# Patient Record
Sex: MALE | Race: WHITE | NOT HISPANIC OR LATINO | Employment: OTHER | URBAN - METROPOLITAN AREA
[De-identification: names, ages, dates, MRNs, and addresses within clinical notes are randomized per-mention and may not be internally consistent; named-entity substitution may affect disease eponyms.]

---

## 2018-02-06 ENCOUNTER — TRANSCRIBE ORDERS (OUTPATIENT)
Dept: ADMINISTRATIVE | Facility: HOSPITAL | Age: 63
End: 2018-02-06

## 2018-02-06 DIAGNOSIS — M79.604 LEG PAIN, BILATERAL: Primary | ICD-10-CM

## 2018-02-06 DIAGNOSIS — M79.605 LEG PAIN, BILATERAL: Primary | ICD-10-CM

## 2018-02-08 ENCOUNTER — TRANSCRIBE ORDERS (OUTPATIENT)
Dept: ADMINISTRATIVE | Facility: HOSPITAL | Age: 63
End: 2018-02-08

## 2018-02-08 DIAGNOSIS — R74.8 ELEVATED LIVER ENZYMES: Primary | ICD-10-CM

## 2018-02-12 ENCOUNTER — TRANSCRIBE ORDERS (OUTPATIENT)
Dept: ADMINISTRATIVE | Facility: HOSPITAL | Age: 63
End: 2018-02-12

## 2018-02-12 DIAGNOSIS — R74.8 ELEVATED LIVER ENZYMES: Primary | ICD-10-CM

## 2018-02-16 ENCOUNTER — HOSPITAL ENCOUNTER (OUTPATIENT)
Dept: CT IMAGING | Facility: HOSPITAL | Age: 63
Discharge: HOME | End: 2018-02-16
Admitting: NURSE PRACTITIONER

## 2018-02-16 DIAGNOSIS — R74.8 ELEVATED LIVER ENZYMES: ICD-10-CM

## 2018-02-16 PROCEDURE — 0 IOPAMIDOL PER 1 ML: Performed by: NURSE PRACTITIONER

## 2018-02-16 PROCEDURE — 74178 CT ABD&PLV WO CNTR FLWD CNTR: CPT

## 2018-02-16 RX ADMIN — IOPAMIDOL 100 ML: 755 INJECTION, SOLUTION INTRAVENOUS at 09:21

## 2018-02-19 ENCOUNTER — APPOINTMENT (OUTPATIENT)
Dept: CT IMAGING | Facility: HOSPITAL | Age: 63
End: 2018-02-19

## 2018-02-19 ENCOUNTER — TRANSCRIBE ORDERS (OUTPATIENT)
Dept: ADMINISTRATIVE | Facility: HOSPITAL | Age: 63
End: 2018-02-19

## 2018-02-19 ENCOUNTER — HOSPITAL ENCOUNTER (OUTPATIENT)
Dept: ULTRASOUND IMAGING | Facility: HOSPITAL | Age: 63
Discharge: HOME | End: 2018-02-19
Admitting: NURSE PRACTITIONER

## 2018-02-19 DIAGNOSIS — M79.605 PAIN IN BOTH LOWER EXTREMITIES: ICD-10-CM

## 2018-02-19 DIAGNOSIS — M79.604 PAIN IN BOTH LOWER EXTREMITIES: ICD-10-CM

## 2018-02-19 DIAGNOSIS — M79.605 LEG PAIN, BILATERAL: ICD-10-CM

## 2018-02-19 DIAGNOSIS — R20.0 LEG NUMBNESS: Primary | ICD-10-CM

## 2018-02-19 DIAGNOSIS — M79.604 LEG PAIN, BILATERAL: ICD-10-CM

## 2018-02-19 PROCEDURE — 93924 LWR XTR VASC STDY BILAT: CPT

## 2018-03-15 ENCOUNTER — APPOINTMENT (OUTPATIENT)
Dept: LAB | Facility: HOSPITAL | Age: 63
End: 2018-03-15
Attending: INTERNAL MEDICINE

## 2018-03-15 ENCOUNTER — HOSPITAL ENCOUNTER (OUTPATIENT)
Dept: MRI IMAGING | Facility: HOSPITAL | Age: 63
Discharge: HOME | End: 2018-03-15
Admitting: NURSE PRACTITIONER

## 2018-03-15 ENCOUNTER — OFFICE VISIT (OUTPATIENT)
Dept: GASTROENTEROLOGY | Facility: CLINIC | Age: 63
End: 2018-03-15

## 2018-03-15 VITALS
WEIGHT: 127.6 LBS | BODY MASS INDEX: 20.51 KG/M2 | HEIGHT: 66 IN | SYSTOLIC BLOOD PRESSURE: 126 MMHG | DIASTOLIC BLOOD PRESSURE: 66 MMHG

## 2018-03-15 DIAGNOSIS — R20.0 LEG NUMBNESS: ICD-10-CM

## 2018-03-15 DIAGNOSIS — R79.89 ELEVATED LFTS: ICD-10-CM

## 2018-03-15 DIAGNOSIS — M79.605 PAIN IN BOTH LOWER EXTREMITIES: ICD-10-CM

## 2018-03-15 DIAGNOSIS — K21.9 GASTROESOPHAGEAL REFLUX DISEASE, ESOPHAGITIS PRESENCE NOT SPECIFIED: Primary | ICD-10-CM

## 2018-03-15 DIAGNOSIS — Z12.11 ENCOUNTER FOR SCREENING FOR MALIGNANT NEOPLASM OF COLON: ICD-10-CM

## 2018-03-15 DIAGNOSIS — M79.604 PAIN IN BOTH LOWER EXTREMITIES: ICD-10-CM

## 2018-03-15 LAB
ALBUMIN SERPL-MCNC: 3.9 G/DL (ref 3.5–5.2)
ALBUMIN/GLOB SERPL: 1.1 G/DL
ALP SERPL-CCNC: 284 U/L (ref 40–129)
ALT SERPL W P-5'-P-CCNC: 51 U/L (ref 5–41)
ANION GAP SERPL CALCULATED.3IONS-SCNC: 13.1 MMOL/L
AST SERPL-CCNC: 133 U/L (ref 5–40)
BASOPHILS # BLD AUTO: 0.15 10*3/MM3 (ref 0–0.2)
BASOPHILS NFR BLD AUTO: 1.2 % (ref 0–2)
BILIRUB SERPL-MCNC: 0.9 MG/DL (ref 0.2–1.2)
BUN BLD-MCNC: 9 MG/DL (ref 8–23)
BUN/CREAT SERPL: 11.8 (ref 7–25)
CALCIUM SPEC-SCNC: 9.5 MG/DL (ref 8.8–10.5)
CHLORIDE SERPL-SCNC: 100 MMOL/L (ref 98–107)
CO2 SERPL-SCNC: 28.9 MMOL/L (ref 22–29)
CREAT BLD-MCNC: 0.76 MG/DL (ref 0.76–1.27)
DEPRECATED RDW RBC AUTO: 59.8 FL (ref 37–54)
EOSINOPHIL # BLD AUTO: 0.16 10*3/MM3 (ref 0.1–0.3)
EOSINOPHIL NFR BLD AUTO: 1.3 % (ref 0–4)
ERYTHROCYTE [DISTWIDTH] IN BLOOD BY AUTOMATED COUNT: 14.1 % (ref 11.5–14.5)
FERRITIN SERPL-MCNC: 1455 NG/ML (ref 30–400)
GFR SERPL CREATININE-BSD FRML MDRD: 104 ML/MIN/1.73
GLOBULIN UR ELPH-MCNC: 3.7 GM/DL
GLUCOSE BLD-MCNC: 90 MG/DL (ref 65–99)
HAV IGM SERPL QL IA: NORMAL
HBV CORE IGM SERPL QL IA: NORMAL
HBV SURFACE AG SERPL QL IA: NORMAL
HCT VFR BLD AUTO: 38.1 % (ref 42–52)
HCV AB SER DONR QL: NORMAL
HGB BLD-MCNC: 12.8 G/DL (ref 14–18)
IMM GRANULOCYTES # BLD: 0.09 10*3/MM3 (ref 0–0.03)
IMM GRANULOCYTES NFR BLD: 0.7 % (ref 0–0.5)
IRON 24H UR-MRATE: 47 MCG/DL (ref 59–158)
IRON SATN MFR SERPL: 23 %
LYMPHOCYTES # BLD AUTO: 2.07 10*3/MM3 (ref 0.6–4.8)
LYMPHOCYTES NFR BLD AUTO: 16.6 % (ref 20–45)
MACROCYTES BLD QL SMEAR: NORMAL
MCH RBC QN AUTO: 38.2 PG (ref 27–31)
MCHC RBC AUTO-ENTMCNC: 33.6 G/DL (ref 31–37)
MCV RBC AUTO: 113.7 FL (ref 80–94)
MONOCYTES # BLD AUTO: 0.64 10*3/MM3 (ref 0–1)
MONOCYTES NFR BLD AUTO: 5.1 % (ref 3–8)
NEUTROPHILS # BLD AUTO: 9.33 10*3/MM3 (ref 1.5–8.3)
NEUTROPHILS NFR BLD AUTO: 75.1 % (ref 45–70)
NRBC BLD MANUAL-RTO: 0 /100 WBC (ref 0–0)
PLAT MORPH BLD: NORMAL
PLATELET # BLD AUTO: 373 10*3/MM3 (ref 140–500)
PMV BLD AUTO: 9.7 FL (ref 7.4–10.4)
POTASSIUM BLD-SCNC: 3.6 MMOL/L (ref 3.5–5.2)
PROT SERPL-MCNC: 7.6 G/DL (ref 6–8.5)
RBC # BLD AUTO: 3.35 10*6/MM3 (ref 4.7–6.1)
SODIUM BLD-SCNC: 142 MMOL/L (ref 136–145)
TIBC SERPL-MCNC: 207 MCG/DL (ref 261–498)
UIBC SERPL-MCNC: 160 MCG/DL (ref 112–346)
WBC MORPH BLD: NORMAL
WBC NRBC COR # BLD: 12.44 10*3/MM3 (ref 4.8–10.8)

## 2018-03-15 PROCEDURE — 80074 ACUTE HEPATITIS PANEL: CPT | Performed by: INTERNAL MEDICINE

## 2018-03-15 PROCEDURE — 83550 IRON BINDING TEST: CPT | Performed by: INTERNAL MEDICINE

## 2018-03-15 PROCEDURE — 86038 ANTINUCLEAR ANTIBODIES: CPT | Performed by: INTERNAL MEDICINE

## 2018-03-15 PROCEDURE — 85007 BL SMEAR W/DIFF WBC COUNT: CPT | Performed by: INTERNAL MEDICINE

## 2018-03-15 PROCEDURE — 80053 COMPREHEN METABOLIC PANEL: CPT | Performed by: INTERNAL MEDICINE

## 2018-03-15 PROCEDURE — 82728 ASSAY OF FERRITIN: CPT | Performed by: INTERNAL MEDICINE

## 2018-03-15 PROCEDURE — 85025 COMPLETE CBC W/AUTO DIFF WBC: CPT | Performed by: INTERNAL MEDICINE

## 2018-03-15 PROCEDURE — 99203 OFFICE O/P NEW LOW 30 MIN: CPT | Performed by: INTERNAL MEDICINE

## 2018-03-15 PROCEDURE — 72148 MRI LUMBAR SPINE W/O DYE: CPT

## 2018-03-15 PROCEDURE — 83516 IMMUNOASSAY NONANTIBODY: CPT | Performed by: INTERNAL MEDICINE

## 2018-03-15 PROCEDURE — 83540 ASSAY OF IRON: CPT | Performed by: INTERNAL MEDICINE

## 2018-03-15 PROCEDURE — 36415 COLL VENOUS BLD VENIPUNCTURE: CPT | Performed by: INTERNAL MEDICINE

## 2018-03-15 PROCEDURE — 82103 ALPHA-1-ANTITRYPSIN TOTAL: CPT | Performed by: INTERNAL MEDICINE

## 2018-03-15 RX ORDER — SODIUM, POTASSIUM,MAG SULFATES 17.5-3.13G
1 SOLUTION, RECONSTITUTED, ORAL ORAL EVERY 12 HOURS
Qty: 2 BOTTLE | Refills: 0 | Status: ON HOLD | COMMUNITY
Start: 2018-03-15 | End: 2018-04-01

## 2018-03-15 NOTE — PROGRESS NOTES
PATIENT INFORMATION  Babak Herrera       - 1955    CHIEF COMPLAINT  Chief Complaint   Patient presents with   • Heartburn   • Elevated Hepatic Enzymes       HISTORY OF PRESENT ILLNESS  HPI    61 yo sent to see me due to elevated liver enzymes note on labs in February. AST in 300's and ALT in 100s.  Normal alk. Phos, amylase and lipase.   TG elevated and cholesterol. CT done which showed:  Precontrast and dynamic postcontrast imaging through the abdomen per  hepatic imaging protocol. Postcontrast imaging through the pelvis.  Enteric contrast not administered. Sagittal and coronal reformatted  images were obtained. Radiation dose reduction techniques were utilized,  including automated exposure control and exposure modulation based on  body size.     ABDOMEN FINDINGS:  Hepatomegaly, 24 cm craniocaudally. Diffuse hepatic steatosis. There are  more generalized and ill-defined somewhat geographic areas of low  densities throughout the liver, greatest in the right hepatic lobe and  caudate lobe, thought to represent more focal superimposed geographic  steatosis. However, no hyperenhancing focal liver lesion is seen, and  there is no evidence of washout kinetics, to suggest underlying  malignancy or hepatocellular carcinoma on this exam.     Gallbladder is normal, no biliary dilation is seen.     Major hepatic vasculature appears opacified and patent.     Spleen size is normal. No ascites.     The pancreas, adrenals and kidneys are within normal limits.     Moderate calcific atherosclerosis within a nonaneurysmal abdominal  aorta.     Lung bases are clear.     Diverticular changes are present within the colon, greatest in its  descending segment, without convincing CT evidence of acute  diverticulitis. The appendix is normal. No evidence of high-grade bowel  obstruction. No pathologic adenopathy is seen.      PELVIS FINDINGS:  Urinary bladder, prostate and rectum are normal. No pelvic adenopathy or  free  fluid.     No acute or suspicious osseous abnormalities are identified. Facet  arthropathy at L4-5.     IMPRESSION:  1. Marked hepatomegaly. Diffuse hepatic steatosis. Irregular somewhat  geographic regions of the superimposed low density throughout the liver  parenchyma, favored to represent areas of more advanced superimposed  geographic hepatic steatosis. Liver edema/hepatitis not excluded.  Correlate with liver function tests and known history.     2. No evidence of cirrhosis. No CT evidence of hepatoma.     He has drank etoh for over 50 years. Patients dad was a heavy drinker.  He was drinking  about 8L of whiskey weekly.  He has stopped in the past 3 weeks  He smokes 2ppd  No family history of autoimmune conditions.      He has underlying gerd and is maintained on nexium daily.   No previous cls. Last egd was 2 years ago at [place].  REVIEW OF SYSTEMS  Review of Systems   Constitutional: Positive for appetite change, chills, fatigue and unexpected weight change.   Eyes: Positive for redness and itching.   Respiratory: Positive for apnea, cough, choking, shortness of breath, wheezing and stridor.    Gastrointestinal:        Reflux   Endocrine: Positive for polydipsia and polyuria.   Genitourinary: Positive for difficulty urinating and frequency.   Musculoskeletal: Positive for arthralgias and myalgias.   Neurological: Positive for weakness and light-headedness.   Hematological: Bruises/bleeds easily.   All other systems reviewed and are negative.        ACTIVE PROBLEMS  There are no active problems to display for this patient.        PAST MEDICAL HISTORY  Past Medical History:   Diagnosis Date   • COPD (chronic obstructive pulmonary disease)    • GERD (gastroesophageal reflux disease)    • Hypertension          SURGICAL HISTORY  No past surgical history on file.      FAMILY HISTORY  No family history on file.      SOCIAL HISTORY  Social History     Occupational History   • Not on file.     Social History Main  "Topics   • Smoking status: Current Every Day Smoker   • Smokeless tobacco: Not on file   • Alcohol use Yes   • Drug use: Unknown   • Sexual activity: Not on file         CURRENT MEDICATIONS    Current Outpatient Prescriptions:   •  amLODIPine (NORVASC) 5 MG tablet, Take 5 mg by mouth Daily., Disp: , Rfl:   •  aspirin 81 MG EC tablet, Take 81 mg by mouth Daily., Disp: , Rfl:   •  budesonide-formoterol (SYMBICORT) 160-4.5 MCG/ACT inhaler, Inhale 2 puffs 2 (Two) Times a Day., Disp: , Rfl:   •  buPROPion (WELLBUTRIN) 75 MG tablet, Take 75 mg by mouth 2 (Two) Times a Day., Disp: , Rfl:   •  esomeprazole (nexIUM) 20 MG capsule, Take 20 mg by mouth Every Morning Before Breakfast., Disp: , Rfl:     ALLERGIES  Lisinopril and Erythromycin    VITALS  Vitals:    03/15/18 0946   BP: 126/66   Weight: 57.9 kg (127 lb 9.6 oz)   Height: 167.6 cm (66\")       LAST RESULTS   No results found for any previous visit.     Ct Abdomen Pelvis With & Without Contrast    Result Date: 2/16/2018  Narrative: CT ABDOMEN WITHOUT AND WITH CONTRAST, CT PELVIS WITH CONTRAST (HEPATIC IMAGING PROTOCOL)  DATE: 02/16/2018.  HISTORY: ABNORMAL LIVER FUNCTION TESTS. ABDOMINAL PAIN INTERMITTENT FOR YEARS. BILATERAL Lower extremity PAIN.  No documented prior CT scan or nuclear medicine myocardial perfusion scan in the past 12 months.  COMPARISON: None.  TECHNIQUE: Precontrast and dynamic postcontrast imaging through the abdomen per hepatic imaging protocol. Postcontrast imaging through the pelvis. Enteric contrast not administered. Sagittal and coronal reformatted images were obtained. Radiation dose reduction techniques were utilized, including automated exposure control and exposure modulation based on body size.  ABDOMEN FINDINGS: Hepatomegaly, 24 cm craniocaudally. Diffuse hepatic steatosis. There are more generalized and ill-defined somewhat geographic areas of low densities throughout the liver, greatest in the right hepatic lobe and caudate lobe, " thought to represent more focal superimposed geographic steatosis. However, no hyperenhancing focal liver lesion is seen, and there is no evidence of washout kinetics, to suggest underlying malignancy or hepatocellular carcinoma on this exam.  Gallbladder is normal, no biliary dilation is seen.  Major hepatic vasculature appears opacified and patent.  Spleen size is normal. No ascites.  The pancreas, adrenals and kidneys are within normal limits.  Moderate calcific atherosclerosis within a nonaneurysmal abdominal aorta.  Lung bases are clear.  Diverticular changes are present within the colon, greatest in its descending segment, without convincing CT evidence of acute diverticulitis. The appendix is normal. No evidence of high-grade bowel obstruction. No pathologic adenopathy is seen.  PELVIS FINDINGS: Urinary bladder, prostate and rectum are normal. No pelvic adenopathy or free fluid.  No acute or suspicious osseous abnormalities are identified. Facet arthropathy at L4-5.      Impression: 1. Marked hepatomegaly. Diffuse hepatic steatosis. Irregular somewhat geographic regions of the superimposed low density throughout the liver parenchyma, favored to represent areas of more advanced superimposed geographic hepatic steatosis. Liver edema/hepatitis not excluded. Correlate with liver function tests and known history.  2. No evidence of cirrhosis. No CT evidence of hepatoma.  This report was finalized on 2/16/2018 3:00 PM by Dr. Sara Seay MD.      Mri Lumbar Spine Without Contrast    Result Date: 3/15/2018  Narrative: MRI LUMBAR SPINE WITHOUT CONTRAST  INDICATION: Lower back pain with bilateral lower extremity radiculopathy for the past year  PROCEDURE: Sagittal and axial T1 and T2-weighted imaging of the lumbar spine  COMPARISON: None  FINDINGS:  Lumbar bodies have normal height. 3 mm anterolisthesis L3 on L4. Otherwise, alignment is preserved. Mild component of congenital central canal narrowing. Conus  terminates at T12-L1 and has normal caliber and signal intensity.  L1-L2: Very mild broad-based posterior disc bulge. No significant central canal narrowing. Mild bilateral neural foraminal narrowing.  L2-L3: Mild facet arthrosis. Mild broad-based posterior disc bulge. Moderate bilateral neural foraminal narrowing related to facet change and foraminal disc osteophytes.  L3-L4: Mild anterolisthesis as detailed above. Moderate to moderately severe bilateral facet arthrosis. Broad-based posterior disc osteophyte. Moderately severe to severe central canal narrowing. Moderate bilateral neural foraminal narrowing.  L4-L5: Moderate facet arthrosis. Broad-based posterior disc osteophyte. Moderately severe central canal narrowing. Moderate to moderately severe bilateral neural foraminal narrowing.  L5-S1: Transitional vertebral body with sacralization of L5. Small remainder the disc at L5-S1. No significant central canal narrowing. No significant neural foraminal      Impression: 3 mm anterolisthesis of L3 on L4. Transitional vertebral body with sacralization of L5.  Multilevel degenerative disc disease and facet arthrosis. Central canal narrowing is most significant at L3-L4.  Neural foraminal narrowing is most significant bilaterally at L4-L5.  This report was finalized on 3/15/2018 9:44 AM by Dr. Chirag Collier MD.      Us Segmental Limbs Lower Arterial With Stress    Result Date: 2/19/2018  Narrative: BILATERAL LOWER EXTREMITY NONINVASIVE ARTERIAL VASCULAR TESTING, INCLUDING POST EXERCISE ASSESSMENT, 02/19/2018:  HISTORY: 62-year-old male referred for a one year history of bilateral leg pain, right worse than left. History of hypertension, hyperlipidemia, smoking.  TECHNIQUE: Lower extremity segmental cuff pressure measurements, pulse-volume recordings (PVR), and segmental arterial Doppler waveforms were obtained at rest bilaterally along with bilateral brachial pressures.  Measured leg levels included the upper and lower  thigh, upper calf, ankle and great toe bilaterally, and included separate posterior tibial and dorsalis pedis measurements at each ankle. Ankle brachial indices were obtained post exercise.  FINDINGS: The examination is within normal limits.  No significant segmental pressure gradients are identified when comparing contiguous lower extremity levels.  Leg-brachial indices are near, or greater than 1.0 at all levels.  Resting JOSE's measure up to 1.10 on the right and 1.11 on the left.  Pulse-volume recordings and segmental Doppler waveforms are within normal limits bilaterally.  There is no evidence of significant lower extremity arterial occlusive disease at rest.  Following exercise, there is normal increase in measured cuff pressure at each ankle. There is no abnormal drop in measured ankle pressure to suggest flow-limiting stenosis with activity.      Impression: 1. Normal examination.  No evidence of significant lower extremity arterial occlusive disease at rest or post exercise. 2. Ankle-brachial indices measure up to 1.10 on the right and 1.11 on the left.  This report was finalized on 2/19/2018 3:38 PM by Dr. Kd Sexton MD.        PHYSICAL EXAM  Physical Exam   Constitutional: He is oriented to person, place, and time. He appears well-developed and well-nourished. No distress.   HENT:   Head: Normocephalic and atraumatic.   Eyes: EOM are normal. Pupils are equal, round, and reactive to light.   Neck: Neck supple. No tracheal deviation present.   Cardiovascular: Normal rate, regular rhythm, normal heart sounds and intact distal pulses.  Exam reveals no gallop and no friction rub.    No murmur heard.  Pulmonary/Chest: Effort normal and breath sounds normal. No respiratory distress. He has no wheezes. He has no rales. He exhibits no tenderness.   Abdominal: Soft. Bowel sounds are normal. He exhibits no distension. There is no tenderness. There is no rebound and no guarding.   Musculoskeletal: He exhibits  no edema.   Lymphadenopathy:     He has no cervical adenopathy.   Neurological: He is alert and oriented to person, place, and time.   Skin: Skin is warm. He is not diaphoretic. No erythema.   Psychiatric: He has a normal mood and affect. His behavior is normal. Judgment and thought content normal.   Nursing note and vitals reviewed.      ASSESSMENT  Diagnoses and all orders for this visit:    Gastroesophageal reflux disease, esophagitis presence not specified    Elevated LFTs  -     Alpha-1-antitrypsin  -     RUFUS  -     CBC and differential  -     Comprehensive metabolic panel  -     Ferritin  -     Hepatitis panel, acute  -     Iron and TIBC  -     Mitochondrial antibodies, M2    Encounter for screening for malignant neoplasm of colon  -     Case Request; Standing  -     Case Request    Other orders  -     Implement Anesthesia orders day of procedure.; Standing  -     Obtain informed consent; Standing          PLAN  No Follow-up on file.    In regards to elevated lfts -pattern likely consistent with etoh use. Ct with steatosis likely from etoh and elevated tg. Continue with alcohol cessation, low carb diet, exercise and weight loss.   Will do full workup.    Antireflux measures and dietary modifications reviewed. Low acid diet reviewed. Keep head of bed elevated. Stop eating/drinking at least 3 hours prior to bedtime. Eliminate caffeine and carbonated beverages.  Weight loss encouraged if BMI over 25.        Risks, benefits and alternatives discussed including but not limited to the complications of bleeding, perforation and sedation related problems.

## 2018-03-16 DIAGNOSIS — R79.89 ELEVATED LFTS: Primary | ICD-10-CM

## 2018-03-16 LAB
ANA PAT SER-IMP: ABNORMAL
ANA SER QL: ABNORMAL

## 2018-03-16 NOTE — PROGRESS NOTES
Will let him know ferritin elevated, think likely related to etoh use but need that lab. Others will discuss on follow up

## 2018-03-19 ENCOUNTER — APPOINTMENT (OUTPATIENT)
Dept: LAB | Facility: HOSPITAL | Age: 63
End: 2018-03-19
Attending: INTERNAL MEDICINE

## 2018-03-19 PROCEDURE — 36415 COLL VENOUS BLD VENIPUNCTURE: CPT

## 2018-03-19 PROCEDURE — 81256 HFE GENE: CPT | Performed by: INTERNAL MEDICINE

## 2018-03-20 LAB
A1AT SERPL-MCNC: 183 MG/DL (ref 90–200)
DEPRECATED MITOCHONDRIA M2 IGG SER-ACNC: <20 UNITS (ref 0–20)

## 2018-03-26 LAB — HFE GENE MUT ANL BLD/T: NORMAL

## 2018-03-29 ENCOUNTER — ANESTHESIA EVENT (OUTPATIENT)
Dept: PERIOP | Facility: HOSPITAL | Age: 63
End: 2018-03-29

## 2018-03-30 ENCOUNTER — HOSPITAL ENCOUNTER (OUTPATIENT)
Facility: HOSPITAL | Age: 63
Setting detail: HOSPITAL OUTPATIENT SURGERY
Discharge: HOME | End: 2018-03-30
Attending: INTERNAL MEDICINE | Admitting: INTERNAL MEDICINE

## 2018-03-30 ENCOUNTER — ANESTHESIA (OUTPATIENT)
Dept: PERIOP | Facility: HOSPITAL | Age: 63
End: 2018-03-30

## 2018-03-30 VITALS
BODY MASS INDEX: 20.79 KG/M2 | SYSTOLIC BLOOD PRESSURE: 125 MMHG | TEMPERATURE: 98 F | OXYGEN SATURATION: 95 % | DIASTOLIC BLOOD PRESSURE: 78 MMHG | WEIGHT: 128.8 LBS | RESPIRATION RATE: 15 BRPM | HEART RATE: 73 BPM

## 2018-03-30 DIAGNOSIS — Z12.11 ENCOUNTER FOR SCREENING FOR MALIGNANT NEOPLASM OF COLON: ICD-10-CM

## 2018-03-30 PROCEDURE — 93010 ELECTROCARDIOGRAM REPORT: CPT | Performed by: INTERNAL MEDICINE

## 2018-03-30 PROCEDURE — 45385 COLONOSCOPY W/LESION REMOVAL: CPT | Performed by: INTERNAL MEDICINE

## 2018-03-30 PROCEDURE — 93005 ELECTROCARDIOGRAM TRACING: CPT | Performed by: NURSE ANESTHETIST, CERTIFIED REGISTERED

## 2018-03-30 PROCEDURE — 25010000002 PROPOFOL 10 MG/ML EMULSION: Performed by: NURSE ANESTHETIST, CERTIFIED REGISTERED

## 2018-03-30 RX ORDER — LIDOCAINE HYDROCHLORIDE 20 MG/ML
INJECTION, SOLUTION INFILTRATION; PERINEURAL AS NEEDED
Status: DISCONTINUED | OUTPATIENT
Start: 2018-03-30 | End: 2018-03-30 | Stop reason: SURG

## 2018-03-30 RX ORDER — SODIUM CHLORIDE, SODIUM LACTATE, POTASSIUM CHLORIDE, CALCIUM CHLORIDE 600; 310; 30; 20 MG/100ML; MG/100ML; MG/100ML; MG/100ML
9 INJECTION, SOLUTION INTRAVENOUS CONTINUOUS
Status: DISCONTINUED | OUTPATIENT
Start: 2018-03-30 | End: 2018-03-30 | Stop reason: HOSPADM

## 2018-03-30 RX ORDER — SODIUM CHLORIDE 9 MG/ML
40 INJECTION, SOLUTION INTRAVENOUS AS NEEDED
Status: DISCONTINUED | OUTPATIENT
Start: 2018-03-30 | End: 2018-03-30 | Stop reason: HOSPADM

## 2018-03-30 RX ORDER — MAGNESIUM HYDROXIDE 1200 MG/15ML
LIQUID ORAL AS NEEDED
Status: DISCONTINUED | OUTPATIENT
Start: 2018-03-30 | End: 2018-03-30 | Stop reason: HOSPADM

## 2018-03-30 RX ORDER — SODIUM CHLORIDE 0.9 % (FLUSH) 0.9 %
1-10 SYRINGE (ML) INJECTION AS NEEDED
Status: DISCONTINUED | OUTPATIENT
Start: 2018-03-30 | End: 2018-03-30 | Stop reason: HOSPADM

## 2018-03-30 RX ORDER — LIDOCAINE HYDROCHLORIDE 10 MG/ML
0.5 INJECTION, SOLUTION EPIDURAL; INFILTRATION; INTRACAUDAL; PERINEURAL ONCE AS NEEDED
Status: DISCONTINUED | OUTPATIENT
Start: 2018-03-30 | End: 2018-03-30 | Stop reason: HOSPADM

## 2018-03-30 RX ORDER — PROPOFOL 10 MG/ML
VIAL (ML) INTRAVENOUS AS NEEDED
Status: DISCONTINUED | OUTPATIENT
Start: 2018-03-30 | End: 2018-03-30 | Stop reason: SURG

## 2018-03-30 RX ADMIN — SODIUM CHLORIDE, POTASSIUM CHLORIDE, SODIUM LACTATE AND CALCIUM CHLORIDE: 600; 310; 30; 20 INJECTION, SOLUTION INTRAVENOUS at 11:31

## 2018-03-30 RX ADMIN — PROPOFOL 50 MG: 10 INJECTION, EMULSION INTRAVENOUS at 13:26

## 2018-03-30 RX ADMIN — LIDOCAINE HYDROCHLORIDE 100 MG: 20 INJECTION, SOLUTION INFILTRATION; PERINEURAL at 13:02

## 2018-03-30 RX ADMIN — PROPOFOL 50 MG: 10 INJECTION, EMULSION INTRAVENOUS at 13:30

## 2018-03-30 RX ADMIN — PROPOFOL 100 MG: 10 INJECTION, EMULSION INTRAVENOUS at 13:05

## 2018-03-30 RX ADMIN — PROPOFOL 50 MG: 10 INJECTION, EMULSION INTRAVENOUS at 13:16

## 2018-03-30 RX ADMIN — PROPOFOL 50 MG: 10 INJECTION, EMULSION INTRAVENOUS at 13:10

## 2018-03-30 RX ADMIN — PROPOFOL 50 MG: 10 INJECTION, EMULSION INTRAVENOUS at 13:13

## 2018-03-30 RX ADMIN — PROPOFOL 50 MG: 10 INJECTION, EMULSION INTRAVENOUS at 13:23

## 2018-03-30 RX ADMIN — PROPOFOL 50 MG: 10 INJECTION, EMULSION INTRAVENOUS at 13:19

## 2018-03-30 RX ADMIN — PROPOFOL 50 MG: 10 INJECTION, EMULSION INTRAVENOUS at 13:07

## 2018-03-30 NOTE — ANESTHESIA POSTPROCEDURE EVALUATION
Patient: Babak Herrera    Procedure Summary     Date:  03/30/18 Room / Location:  [facility] ENDOSCOPY 2 /  [facility] OR    Anesthesia Start:  1301 Anesthesia Stop:  1347    Procedure:  COLONOSCOPY,  polypectomy (N/A ) Diagnosis:       Encounter for screening for malignant neoplasm of colon      (Encounter for screening for malignant neoplasm of colon [Z12.11])    Surgeon:  Nicolasa Stewart MD Provider:  Oswaldo Mcwilliams CRNA    Anesthesia Type:  MAC ASA Status:  3          Anesthesia Type: MAC  Last vitals  BP   110/77 (03/30/18 1353)   Temp   98 °F (36.7 °C) (03/30/18 1353)   Pulse   79 (03/30/18 1353)   Resp   16 (03/30/18 1353)     SpO2   93 % (03/30/18 1353)     Post Anesthesia Care and Evaluation    Patient location during evaluation: PHASE II  Patient participation: complete - patient participated  Level of consciousness: awake and alert  Pain management: adequate  Airway patency: patent  Anesthetic complications: No anesthetic complications    Cardiovascular status: acceptable and hemodynamically stable  Respiratory status: acceptable  Hydration status: acceptable

## 2018-03-30 NOTE — ANESTHESIA PREPROCEDURE EVALUATION
" Anesthesia Evaluation     Patient summary reviewed and Nursing notes reviewed   NPO Solid Status: > 8 hours  NPO Liquid Status: > 2 hours           Airway   TM distance: >3 FB  Neck ROM: full  No difficulty expected  Dental      Comment: DENTAL IMPLANTS     Pulmonary    (+) a smoker (2 PPDX 50 YRS) Current Smoked day of surgery, COPD, sleep apnea, rhonchi (SCATTERED LIGHT),     ROS comment: \"SMOKERS COUGH\" STATES CLEAR SPUTUM  Cardiovascular - normal exam  Exercise tolerance: good (4-7 METS)    (+) hypertension,     ROS comment: Denies chest pain or SOB    Neuro/Psych- negative ROS  GI/Hepatic/Renal/Endo    (+)  GERD,    Liver disease: ELAVATED LIVER ENZYMES.    Musculoskeletal (-) negative ROS    Abdominal    Substance History   (+) alcohol use (ALCOHLIC/ QUIT DRINKING 30 DAYS AGO),      OB/GYN          Other                        Anesthesia Plan    ASA 3     MAC     intravenous induction   Anesthetic plan and risks discussed with patient.  Use of blood products discussed with patient  Consented to blood products.     "

## 2018-03-31 ENCOUNTER — APPOINTMENT (OUTPATIENT)
Dept: CT IMAGING | Facility: HOSPITAL | Age: 63
End: 2018-03-31

## 2018-03-31 ENCOUNTER — HOSPITAL ENCOUNTER (INPATIENT)
Facility: HOSPITAL | Age: 63
LOS: 6 days | Discharge: HOME | End: 2018-04-06
Attending: EMERGENCY MEDICINE | Admitting: INTERNAL MEDICINE

## 2018-03-31 DIAGNOSIS — R79.89 ELEVATED LFTS: ICD-10-CM

## 2018-03-31 DIAGNOSIS — K81.0 ACUTE CHOLECYSTITIS: Primary | ICD-10-CM

## 2018-03-31 DIAGNOSIS — R10.11 RIGHT UPPER QUADRANT ABDOMINAL PAIN: ICD-10-CM

## 2018-03-31 PROBLEM — R10.9 ABDOMINAL PAIN: Status: ACTIVE | Noted: 2018-03-31

## 2018-03-31 LAB
ALBUMIN SERPL-MCNC: 4.3 G/DL (ref 3.5–5.2)
ALBUMIN/GLOB SERPL: 1.1 G/DL
ALP SERPL-CCNC: 253 U/L (ref 40–129)
ALT SERPL W P-5'-P-CCNC: 37 U/L (ref 5–41)
ANION GAP SERPL CALCULATED.3IONS-SCNC: 14.6 MMOL/L
APTT PPP: 38.9 SECONDS (ref 24.3–38.1)
AST SERPL-CCNC: 82 U/L (ref 5–40)
BACTERIA UR QL AUTO: ABNORMAL /HPF
BASOPHILS # BLD AUTO: 0.1 10*3/MM3 (ref 0–0.2)
BASOPHILS NFR BLD AUTO: 0.6 % (ref 0–2)
BILIRUB SERPL-MCNC: 0.8 MG/DL (ref 0.2–1.2)
BILIRUB UR QL STRIP: NEGATIVE
BUN BLD-MCNC: 8 MG/DL (ref 8–23)
BUN/CREAT SERPL: 10 (ref 7–25)
CALCIUM SPEC-SCNC: 9.3 MG/DL (ref 8.8–10.5)
CHLORIDE SERPL-SCNC: 95 MMOL/L (ref 98–107)
CLARITY UR: CLEAR
CO2 SERPL-SCNC: 23.4 MMOL/L (ref 22–29)
COLOR UR: YELLOW
CREAT BLD-MCNC: 0.8 MG/DL (ref 0.76–1.27)
D-LACTATE SERPL-SCNC: 1.4 MMOL/L (ref 0.5–2)
DEPRECATED RDW RBC AUTO: 53.4 FL (ref 37–54)
EOSINOPHIL # BLD AUTO: 0.06 10*3/MM3 (ref 0.1–0.3)
EOSINOPHIL NFR BLD AUTO: 0.4 % (ref 0–4)
ERYTHROCYTE [DISTWIDTH] IN BLOOD BY AUTOMATED COUNT: 13.5 % (ref 11.5–14.5)
GFR SERPL CREATININE-BSD FRML MDRD: 98 ML/MIN/1.73
GLOBULIN UR ELPH-MCNC: 3.8 GM/DL
GLUCOSE BLD-MCNC: 140 MG/DL (ref 65–99)
GLUCOSE UR STRIP-MCNC: NEGATIVE MG/DL
HCT VFR BLD AUTO: 41.5 % (ref 42–52)
HGB BLD-MCNC: 14.2 G/DL (ref 14–18)
HGB UR QL STRIP.AUTO: ABNORMAL
HYALINE CASTS UR QL AUTO: ABNORMAL /LPF
IMM GRANULOCYTES # BLD: 0.06 10*3/MM3 (ref 0–0.03)
IMM GRANULOCYTES NFR BLD: 0.4 % (ref 0–0.5)
INR PPP: 0.97 (ref 0.9–1.1)
KETONES UR QL STRIP: NEGATIVE
LEUKOCYTE ESTERASE UR QL STRIP.AUTO: NEGATIVE
LIPASE SERPL-CCNC: 25 U/L (ref 13–60)
LYMPHOCYTES # BLD AUTO: 1.49 10*3/MM3 (ref 0.6–4.8)
LYMPHOCYTES NFR BLD AUTO: 9.3 % (ref 20–45)
MACROCYTES BLD QL SMEAR: NORMAL
MCH RBC QN AUTO: 36.4 PG (ref 27–31)
MCHC RBC AUTO-ENTMCNC: 34.2 G/DL (ref 31–37)
MCV RBC AUTO: 106.4 FL (ref 80–94)
MONOCYTES # BLD AUTO: 0.62 10*3/MM3 (ref 0–1)
MONOCYTES NFR BLD AUTO: 3.9 % (ref 3–8)
NEUTROPHILS # BLD AUTO: 13.68 10*3/MM3 (ref 1.5–8.3)
NEUTROPHILS NFR BLD AUTO: 85.4 % (ref 45–70)
NITRITE UR QL STRIP: NEGATIVE
NRBC BLD MANUAL-RTO: 0 /100 WBC (ref 0–0)
PH UR STRIP.AUTO: 6 [PH] (ref 4.5–8)
PLAT MORPH BLD: NORMAL
PLATELET # BLD AUTO: 279 10*3/MM3 (ref 140–500)
PMV BLD AUTO: 11 FL (ref 7.4–10.4)
POTASSIUM BLD-SCNC: 3.5 MMOL/L (ref 3.5–5.2)
PROT SERPL-MCNC: 8.1 G/DL (ref 6–8.5)
PROT UR QL STRIP: NEGATIVE
PROTHROMBIN TIME: 12.9 SECONDS (ref 12.1–15)
RBC # BLD AUTO: 3.9 10*6/MM3 (ref 4.7–6.1)
RBC # UR: ABNORMAL /HPF
REF LAB TEST METHOD: ABNORMAL
SODIUM BLD-SCNC: 133 MMOL/L (ref 136–145)
SP GR UR STRIP: 1.01 (ref 1–1.03)
SQUAMOUS #/AREA URNS HPF: ABNORMAL /HPF
STOMATOCYTES BLD QL SMEAR: NORMAL
TROPONIN T SERPL-MCNC: <0.01 NG/ML (ref 0–0.03)
UROBILINOGEN UR QL STRIP: ABNORMAL
WBC MORPH BLD: NORMAL
WBC NRBC COR # BLD: 16.01 10*3/MM3 (ref 4.8–10.8)
WBC UR QL AUTO: ABNORMAL /HPF

## 2018-03-31 PROCEDURE — 74177 CT ABD & PELVIS W/CONTRAST: CPT

## 2018-03-31 PROCEDURE — 99285 EMERGENCY DEPT VISIT HI MDM: CPT

## 2018-03-31 PROCEDURE — 80053 COMPREHEN METABOLIC PANEL: CPT | Performed by: PHYSICIAN ASSISTANT

## 2018-03-31 PROCEDURE — 84484 ASSAY OF TROPONIN QUANT: CPT | Performed by: PHYSICIAN ASSISTANT

## 2018-03-31 PROCEDURE — 99284 EMERGENCY DEPT VISIT MOD MDM: CPT | Performed by: PHYSICIAN ASSISTANT

## 2018-03-31 PROCEDURE — 85007 BL SMEAR W/DIFF WBC COUNT: CPT | Performed by: PHYSICIAN ASSISTANT

## 2018-03-31 PROCEDURE — 85730 THROMBOPLASTIN TIME PARTIAL: CPT | Performed by: PHYSICIAN ASSISTANT

## 2018-03-31 PROCEDURE — 81001 URINALYSIS AUTO W/SCOPE: CPT | Performed by: PHYSICIAN ASSISTANT

## 2018-03-31 PROCEDURE — 25010000002 PIPERACILLIN-TAZOBACTAM: Performed by: PHYSICIAN ASSISTANT

## 2018-03-31 PROCEDURE — 85025 COMPLETE CBC W/AUTO DIFF WBC: CPT | Performed by: PHYSICIAN ASSISTANT

## 2018-03-31 PROCEDURE — 83690 ASSAY OF LIPASE: CPT | Performed by: PHYSICIAN ASSISTANT

## 2018-03-31 PROCEDURE — 25010000002 ONDANSETRON PER 1 MG: Performed by: PHYSICIAN ASSISTANT

## 2018-03-31 PROCEDURE — 25010000002 HYDROMORPHONE PER 4 MG: Performed by: EMERGENCY MEDICINE

## 2018-03-31 PROCEDURE — 0 IOPAMIDOL PER 1 ML: Performed by: EMERGENCY MEDICINE

## 2018-03-31 PROCEDURE — 85610 PROTHROMBIN TIME: CPT | Performed by: PHYSICIAN ASSISTANT

## 2018-03-31 PROCEDURE — 83605 ASSAY OF LACTIC ACID: CPT | Performed by: EMERGENCY MEDICINE

## 2018-03-31 PROCEDURE — 25010000002 MORPHINE PER 10 MG: Performed by: EMERGENCY MEDICINE

## 2018-03-31 PROCEDURE — 25010000002 HYDROMORPHONE HCL PF 500 MG/50ML SOLUTION: Performed by: INTERNAL MEDICINE

## 2018-03-31 RX ORDER — HYDROMORPHONE HCL 110MG/55ML
PATIENT CONTROLLED ANALGESIA SYRINGE INTRAVENOUS
Status: DISPENSED
Start: 2018-03-31 | End: 2018-04-01

## 2018-03-31 RX ORDER — FAMOTIDINE 20 MG/50ML
20 INJECTION, SOLUTION INTRAVENOUS ONCE
Status: COMPLETED | OUTPATIENT
Start: 2018-03-31 | End: 2018-03-31

## 2018-03-31 RX ORDER — MORPHINE SULFATE 2 MG/ML
1 INJECTION, SOLUTION INTRAMUSCULAR; INTRAVENOUS ONCE
Status: COMPLETED | OUTPATIENT
Start: 2018-03-31 | End: 2018-03-31

## 2018-03-31 RX ORDER — NICOTINE 21 MG/24HR
1 PATCH, TRANSDERMAL 24 HOURS TRANSDERMAL EVERY 24 HOURS
Status: DISCONTINUED | OUTPATIENT
Start: 2018-03-31 | End: 2018-04-06 | Stop reason: HOSPADM

## 2018-03-31 RX ORDER — SODIUM CHLORIDE 0.9 % (FLUSH) 0.9 %
10 SYRINGE (ML) INJECTION AS NEEDED
Status: DISCONTINUED | OUTPATIENT
Start: 2018-03-31 | End: 2018-04-06 | Stop reason: HOSPADM

## 2018-03-31 RX ORDER — HYDROMORPHONE HCL 110MG/55ML
0.5 PATIENT CONTROLLED ANALGESIA SYRINGE INTRAVENOUS ONCE
Status: COMPLETED | OUTPATIENT
Start: 2018-03-31 | End: 2018-03-31

## 2018-03-31 RX ORDER — SODIUM CHLORIDE 9 MG/ML
125 INJECTION, SOLUTION INTRAVENOUS CONTINUOUS
Status: DISCONTINUED | OUTPATIENT
Start: 2018-03-31 | End: 2018-04-01

## 2018-03-31 RX ORDER — ONDANSETRON 2 MG/ML
4 INJECTION INTRAMUSCULAR; INTRAVENOUS ONCE
Status: COMPLETED | OUTPATIENT
Start: 2018-03-31 | End: 2018-03-31

## 2018-03-31 RX ORDER — NICOTINE 21 MG/24HR
PATCH, TRANSDERMAL 24 HOURS TRANSDERMAL
Status: COMPLETED
Start: 2018-03-31 | End: 2018-04-01

## 2018-03-31 RX ADMIN — HYDROMORPHONE HYDROCHLORIDE 0.5 MG: 10 INJECTION INTRAMUSCULAR; INTRAVENOUS; SUBCUTANEOUS at 22:24

## 2018-03-31 RX ADMIN — HYDROMORPHONE HYDROCHLORIDE 0.5 MG: 2 INJECTION INTRAMUSCULAR; INTRAVENOUS; SUBCUTANEOUS at 17:56

## 2018-03-31 RX ADMIN — NICOTINE 1 PATCH: 21 PATCH TRANSDERMAL at 22:14

## 2018-03-31 RX ADMIN — HYDROMORPHONE HYDROCHLORIDE 0.5 MG: 2 INJECTION INTRAMUSCULAR; INTRAVENOUS; SUBCUTANEOUS at 18:21

## 2018-03-31 RX ADMIN — FAMOTIDINE 20 MG: 20 INJECTION, SOLUTION INTRAVENOUS at 17:57

## 2018-03-31 RX ADMIN — MORPHINE SULFATE 1 MG: 2 INJECTION, SOLUTION INTRAMUSCULAR; INTRAVENOUS at 19:54

## 2018-03-31 RX ADMIN — ONDANSETRON 4 MG: 2 INJECTION INTRAMUSCULAR; INTRAVENOUS at 17:55

## 2018-03-31 RX ADMIN — SODIUM CHLORIDE 125 ML/HR: 9 INJECTION, SOLUTION INTRAVENOUS at 19:44

## 2018-03-31 RX ADMIN — NICOTINE 1 PATCH: 21 PATCH, EXTENDED RELEASE TOPICAL at 22:14

## 2018-03-31 RX ADMIN — TAZOBACTAM SODIUM AND PIPERACILLIN SODIUM 4.5 G: .5; 4 INJECTION, POWDER, LYOPHILIZED, FOR SOLUTION INTRAVENOUS at 19:46

## 2018-03-31 RX ADMIN — IOPAMIDOL 100 ML: 755 INJECTION, SOLUTION INTRAVENOUS at 18:45

## 2018-04-01 ENCOUNTER — APPOINTMENT (OUTPATIENT)
Dept: GENERAL RADIOLOGY | Facility: HOSPITAL | Age: 63
End: 2018-04-01

## 2018-04-01 ENCOUNTER — APPOINTMENT (OUTPATIENT)
Dept: ULTRASOUND IMAGING | Facility: HOSPITAL | Age: 63
End: 2018-04-01

## 2018-04-01 ENCOUNTER — APPOINTMENT (OUTPATIENT)
Dept: NUCLEAR MEDICINE | Facility: HOSPITAL | Age: 63
End: 2018-04-01

## 2018-04-01 LAB
ALBUMIN SERPL-MCNC: 3.9 G/DL (ref 3.5–5.2)
ALBUMIN/GLOB SERPL: 1.2 G/DL
ALP SERPL-CCNC: 240 U/L (ref 40–129)
ALT SERPL W P-5'-P-CCNC: 40 U/L (ref 5–41)
ANION GAP SERPL CALCULATED.3IONS-SCNC: 14.5 MMOL/L
AST SERPL-CCNC: 82 U/L (ref 5–40)
BASOPHILS # BLD AUTO: 0.12 10*3/MM3 (ref 0–0.2)
BASOPHILS NFR BLD AUTO: 0.7 % (ref 0–2)
BILIRUB SERPL-MCNC: 1.6 MG/DL (ref 0.2–1.2)
BUN BLD-MCNC: 7 MG/DL (ref 8–23)
BUN/CREAT SERPL: 9.7 (ref 7–25)
CALCIUM SPEC-SCNC: 9 MG/DL (ref 8.8–10.5)
CHLORIDE SERPL-SCNC: 98 MMOL/L (ref 98–107)
CO2 SERPL-SCNC: 22.5 MMOL/L (ref 22–29)
CREAT BLD-MCNC: 0.72 MG/DL (ref 0.76–1.27)
DEPRECATED RDW RBC AUTO: 53.1 FL (ref 37–54)
EOSINOPHIL # BLD AUTO: 0.16 10*3/MM3 (ref 0.1–0.3)
EOSINOPHIL NFR BLD AUTO: 0.9 % (ref 0–4)
ERYTHROCYTE [DISTWIDTH] IN BLOOD BY AUTOMATED COUNT: 13.3 % (ref 11.5–14.5)
GFR SERPL CREATININE-BSD FRML MDRD: 110 ML/MIN/1.73
GLOBULIN UR ELPH-MCNC: 3.2 GM/DL
GLUCOSE BLD-MCNC: 113 MG/DL (ref 65–99)
HCT VFR BLD AUTO: 40.3 % (ref 42–52)
HGB BLD-MCNC: 13.7 G/DL (ref 14–18)
IMM GRANULOCYTES # BLD: 0.07 10*3/MM3 (ref 0–0.03)
IMM GRANULOCYTES NFR BLD: 0.4 % (ref 0–0.5)
INR PPP: 0.96 (ref 0.9–1.1)
LYMPHOCYTES # BLD AUTO: 1.35 10*3/MM3 (ref 0.6–4.8)
LYMPHOCYTES NFR BLD AUTO: 7.7 % (ref 20–45)
MCH RBC QN AUTO: 36.1 PG (ref 27–31)
MCHC RBC AUTO-ENTMCNC: 34 G/DL (ref 31–37)
MCV RBC AUTO: 106.3 FL (ref 80–94)
MONOCYTES # BLD AUTO: 1.22 10*3/MM3 (ref 0–1)
MONOCYTES NFR BLD AUTO: 7 % (ref 3–8)
NEUTROPHILS # BLD AUTO: 14.5 10*3/MM3 (ref 1.5–8.3)
NEUTROPHILS NFR BLD AUTO: 83.3 % (ref 45–70)
NRBC BLD MANUAL-RTO: 0 /100 WBC (ref 0–0)
PLATELET # BLD AUTO: 244 10*3/MM3 (ref 140–500)
PMV BLD AUTO: 10.8 FL (ref 7.4–10.4)
POTASSIUM BLD-SCNC: 3.9 MMOL/L (ref 3.5–5.2)
PROT SERPL-MCNC: 7.1 G/DL (ref 6–8.5)
PROTHROMBIN TIME: 12.8 SECONDS (ref 12.1–15)
RBC # BLD AUTO: 3.79 10*6/MM3 (ref 4.7–6.1)
SODIUM BLD-SCNC: 135 MMOL/L (ref 136–145)
TSH SERPL DL<=0.05 MIU/L-ACNC: 3.7 MIU/ML (ref 0.27–4.2)
WBC NRBC COR # BLD: 17.42 10*3/MM3 (ref 4.8–10.8)

## 2018-04-01 PROCEDURE — 84443 ASSAY THYROID STIM HORMONE: CPT | Performed by: INTERNAL MEDICINE

## 2018-04-01 PROCEDURE — 99222 1ST HOSP IP/OBS MODERATE 55: CPT | Performed by: INTERNAL MEDICINE

## 2018-04-01 PROCEDURE — A9537 TC99M MEBROFENIN: HCPCS | Performed by: INTERNAL MEDICINE

## 2018-04-01 PROCEDURE — 85025 COMPLETE CBC W/AUTO DIFF WBC: CPT | Performed by: INTERNAL MEDICINE

## 2018-04-01 PROCEDURE — 78226 HEPATOBILIARY SYSTEM IMAGING: CPT

## 2018-04-01 PROCEDURE — 0 TECHNETIUM TC 99M MEBROFENIN KIT: Performed by: INTERNAL MEDICINE

## 2018-04-01 PROCEDURE — 99252 IP/OBS CONSLTJ NEW/EST SF 35: CPT | Performed by: SURGERY

## 2018-04-01 PROCEDURE — 71046 X-RAY EXAM CHEST 2 VIEWS: CPT

## 2018-04-01 PROCEDURE — 85610 PROTHROMBIN TIME: CPT | Performed by: INTERNAL MEDICINE

## 2018-04-01 PROCEDURE — 25010000002 HYDROMORPHONE HCL PF 500 MG/50ML SOLUTION: Performed by: INTERNAL MEDICINE

## 2018-04-01 PROCEDURE — 94640 AIRWAY INHALATION TREATMENT: CPT

## 2018-04-01 PROCEDURE — 76700 US EXAM ABDOM COMPLETE: CPT

## 2018-04-01 PROCEDURE — 25010000002 PIPERACILLIN-TAZOBACTAM: Performed by: INTERNAL MEDICINE

## 2018-04-01 PROCEDURE — 93005 ELECTROCARDIOGRAM TRACING: CPT | Performed by: HOSPITALIST

## 2018-04-01 PROCEDURE — 25010000002 PIPERACILLIN SOD-TAZOBACTAM PER 1 G: Performed by: INTERNAL MEDICINE

## 2018-04-01 PROCEDURE — 93010 ELECTROCARDIOGRAM REPORT: CPT | Performed by: INTERNAL MEDICINE

## 2018-04-01 PROCEDURE — 80053 COMPREHEN METABOLIC PANEL: CPT | Performed by: INTERNAL MEDICINE

## 2018-04-01 PROCEDURE — 25010000002 PIPERACILLIN SOD-TAZOBACTAM PER 1 G

## 2018-04-01 PROCEDURE — 94799 UNLISTED PULMONARY SVC/PX: CPT

## 2018-04-01 RX ORDER — PANTOPRAZOLE SODIUM 20 MG/1
40 TABLET, DELAYED RELEASE ORAL EVERY MORNING
Status: DISCONTINUED | OUTPATIENT
Start: 2018-04-01 | End: 2018-04-02

## 2018-04-01 RX ORDER — ONDANSETRON 2 MG/ML
4 INJECTION INTRAMUSCULAR; INTRAVENOUS EVERY 6 HOURS PRN
Status: DISCONTINUED | OUTPATIENT
Start: 2018-04-01 | End: 2018-04-06 | Stop reason: HOSPADM

## 2018-04-01 RX ORDER — PIPERACILLIN SODIUM, TAZOBACTAM SODIUM 4; .5 G/20ML; G/20ML
INJECTION, POWDER, LYOPHILIZED, FOR SOLUTION INTRAVENOUS
Status: COMPLETED
Start: 2018-04-01 | End: 2018-04-01

## 2018-04-01 RX ORDER — SENNA AND DOCUSATE SODIUM 50; 8.6 MG/1; MG/1
2 TABLET, FILM COATED ORAL NIGHTLY PRN
Status: DISCONTINUED | OUTPATIENT
Start: 2018-04-01 | End: 2018-04-06 | Stop reason: HOSPADM

## 2018-04-01 RX ORDER — ACETAMINOPHEN 325 MG/1
650 TABLET ORAL EVERY 4 HOURS PRN
Status: DISCONTINUED | OUTPATIENT
Start: 2018-04-01 | End: 2018-04-06 | Stop reason: HOSPADM

## 2018-04-01 RX ORDER — SODIUM CHLORIDE 9 MG/ML
40 INJECTION, SOLUTION INTRAVENOUS AS NEEDED
Status: DISCONTINUED | OUTPATIENT
Start: 2018-04-01 | End: 2018-04-06 | Stop reason: HOSPADM

## 2018-04-01 RX ORDER — ONDANSETRON 4 MG/1
4 TABLET, FILM COATED ORAL EVERY 6 HOURS PRN
Status: DISCONTINUED | OUTPATIENT
Start: 2018-04-01 | End: 2018-04-06 | Stop reason: HOSPADM

## 2018-04-01 RX ORDER — HYDROCODONE BITARTRATE AND ACETAMINOPHEN 5; 325 MG/1; MG/1
1 TABLET ORAL EVERY 4 HOURS PRN
Status: DISCONTINUED | OUTPATIENT
Start: 2018-04-01 | End: 2018-04-03

## 2018-04-01 RX ORDER — SODIUM CHLORIDE 0.9 % (FLUSH) 0.9 %
1-10 SYRINGE (ML) INJECTION AS NEEDED
Status: DISCONTINUED | OUTPATIENT
Start: 2018-04-01 | End: 2018-04-06 | Stop reason: HOSPADM

## 2018-04-01 RX ORDER — SODIUM CHLORIDE 9 MG/ML
150 INJECTION, SOLUTION INTRAVENOUS CONTINUOUS
Status: CANCELLED | OUTPATIENT
Start: 2018-04-01

## 2018-04-01 RX ORDER — AMLODIPINE BESYLATE 5 MG/1
5 TABLET ORAL DAILY
Status: DISCONTINUED | OUTPATIENT
Start: 2018-04-01 | End: 2018-04-06 | Stop reason: HOSPADM

## 2018-04-01 RX ORDER — ALBUTEROL SULFATE 2.5 MG/3ML
2.5 SOLUTION RESPIRATORY (INHALATION) EVERY 6 HOURS PRN
Status: DISCONTINUED | OUTPATIENT
Start: 2018-04-01 | End: 2018-04-06 | Stop reason: HOSPADM

## 2018-04-01 RX ORDER — KIT FOR THE PREPARATION OF TECHNETIUM TC 99M MEBROFENIN 45 MG/10ML
1 INJECTION, POWDER, LYOPHILIZED, FOR SOLUTION INTRAVENOUS
Status: COMPLETED | OUTPATIENT
Start: 2018-04-01 | End: 2018-04-01

## 2018-04-01 RX ORDER — BUDESONIDE AND FORMOTEROL FUMARATE DIHYDRATE 160; 4.5 UG/1; UG/1
2 AEROSOL RESPIRATORY (INHALATION) 2 TIMES DAILY
Status: DISCONTINUED | OUTPATIENT
Start: 2018-04-01 | End: 2018-04-06 | Stop reason: HOSPADM

## 2018-04-01 RX ORDER — SODIUM CHLORIDE 9 MG/ML
50 INJECTION, SOLUTION INTRAVENOUS CONTINUOUS
Status: DISCONTINUED | OUTPATIENT
Start: 2018-04-01 | End: 2018-04-05

## 2018-04-01 RX ORDER — ONDANSETRON 4 MG/1
4 TABLET, ORALLY DISINTEGRATING ORAL EVERY 6 HOURS PRN
Status: DISCONTINUED | OUTPATIENT
Start: 2018-04-01 | End: 2018-04-06 | Stop reason: HOSPADM

## 2018-04-01 RX ADMIN — PIPERACILLIN AND TAZOBACTAM 4.5 G: 4; .5 INJECTION, POWDER, FOR SOLUTION INTRAVENOUS at 03:11

## 2018-04-01 RX ADMIN — SODIUM CHLORIDE 125 ML/HR: 9 INJECTION, SOLUTION INTRAVENOUS at 08:15

## 2018-04-01 RX ADMIN — BUDESONIDE AND FORMOTEROL FUMARATE DIHYDRATE 2 PUFF: 160; 4.5 AEROSOL RESPIRATORY (INHALATION) at 08:19

## 2018-04-01 RX ADMIN — HYDROMORPHONE HYDROCHLORIDE 0.5 MG: 10 INJECTION INTRAMUSCULAR; INTRAVENOUS; SUBCUTANEOUS at 00:28

## 2018-04-01 RX ADMIN — TAZOBACTAM SODIUM AND PIPERACILLIN SODIUM 4.5 G: .5; 4 INJECTION, POWDER, LYOPHILIZED, FOR SOLUTION INTRAVENOUS at 03:45

## 2018-04-01 RX ADMIN — AMLODIPINE BESYLATE 5 MG: 5 TABLET ORAL at 08:02

## 2018-04-01 RX ADMIN — ASCORBIC ACID, THIAMINE MONONITRATE,RIBOFLAVIN, NIACINAMIDE, PYRIDOXINE HYDROCHLORIDE, FOLIC ACID, CYANOCOBALAMIN, BIOTIN, CALCIUM PANTOTHENATE, 1 MG: 100; 1.5; 1.7; 20; 10; 1; 6000; 150000; 5 CAPSULE, LIQUID FILLED ORAL at 08:04

## 2018-04-01 RX ADMIN — NICOTINE 1 PATCH: 21 PATCH TRANSDERMAL at 22:09

## 2018-04-01 RX ADMIN — BUDESONIDE AND FORMOTEROL FUMARATE DIHYDRATE 2 PUFF: 160; 4.5 AEROSOL RESPIRATORY (INHALATION) at 20:48

## 2018-04-01 RX ADMIN — HYDROCODONE BITARTRATE AND ACETAMINOPHEN 1 TABLET: 5; 325 TABLET ORAL at 11:44

## 2018-04-01 RX ADMIN — PANTOPRAZOLE SODIUM 40 MG: 40 TABLET, DELAYED RELEASE ORAL at 06:32

## 2018-04-01 RX ADMIN — HYDROMORPHONE HYDROCHLORIDE 0.5 MG: 10 INJECTION INTRAMUSCULAR; INTRAVENOUS; SUBCUTANEOUS at 03:10

## 2018-04-01 RX ADMIN — PIPERACILLIN SODIUM,TAZOBACTAM SODIUM 4.5 G: 4; .5 INJECTION, POWDER, FOR SOLUTION INTRAVENOUS at 11:47

## 2018-04-01 RX ADMIN — PIPERACILLIN SODIUM,TAZOBACTAM SODIUM 4.5 G: 4; .5 INJECTION, POWDER, FOR SOLUTION INTRAVENOUS at 17:55

## 2018-04-01 RX ADMIN — HYDROCODONE BITARTRATE AND ACETAMINOPHEN 1 TABLET: 5; 325 TABLET ORAL at 08:02

## 2018-04-01 RX ADMIN — HYDROCODONE BITARTRATE AND ACETAMINOPHEN 1 TABLET: 5; 325 TABLET ORAL at 02:38

## 2018-04-01 RX ADMIN — MEBROFENIN 1 DOSE: 45 INJECTION, POWDER, LYOPHILIZED, FOR SOLUTION INTRAVENOUS at 14:20

## 2018-04-01 RX ADMIN — SODIUM CHLORIDE 100 ML/HR: 9 INJECTION, SOLUTION INTRAVENOUS at 11:44

## 2018-04-01 NOTE — PROGRESS NOTES
"Hospitalist Team      Patient Care Team:  No Known Provider as PCP - General        Chief Complaint:  Acute cholecystitis    Subjective    Interval History and ROS:     Patient notes he is still having significant pain in his RUQ. Pain meds are helping. He has had some nausea and vomited this AM but notes currently the nausea has resolved. He is afebrile. He denies any CP, SOA or heart palpitations. He notes he has never had any symptoms of EtOH withdrawal and his last alcoholic beverage was 4-6 weeks ago.      Objective    Vital Signs  Temp:  [97.3 °F (36.3 °C)-98.1 °F (36.7 °C)] 98.1 °F (36.7 °C)  Heart Rate:  [61-71] 64  Resp:  [16-20] 16  BP: (140-161)/(75-88) 161/84  Oxygen Therapy  SpO2: 97 %  Pulse Oximetry Type: Intermittent  Device (Oxygen Therapy): room air   Body mass index is 20.83 kg/m².      Flowsheet Rows    Flowsheet Row First Filed Value   Admission Height 167 cm (65.75\") Documented at 03/31/2018 1737   Admission Weight 58.1 kg (128 lb) Documented at 03/31/2018 1737            Physical Exam:  Constitutional: Patient appears thin, older than his stated age and in no acute distress   HEENT:   Head: Normocephalic and atraumatic.   Eyes: EOM are intact. Sclera are anicteric and non-injected.  Mouth and Throat: Patient has moist mucous membranes.      Neck: Neck supple. No JVD present.   Cardiovascular: Regular rate, regular rhythm.  Exam reveals no gallop and no friction rub.  No murmur heard.  Pulmonary/Chest: Lungs with slightly diminished breath sounds throughout. No respiratory distress. No wheezes. No rhonchi. No rales.   Abdominal: Soft. Bowel sounds are normal. There is significant tenderness to even light palpation in the RUQ and epigastric area.   Extremities: No edema. Pulses are palpable in all 4 extremities.  Neurological: Patient is alert and oriented to person, place, and time.   Skin: Skin is warm. No rash noted. No cyanosis or erythema.      Results Review:     I reviewed the patient's " new clinical results.    Lab Results (last 24 hours)     Procedure Component Value Units Date/Time    Comprehensive Metabolic Panel [[id number]]  (Abnormal) Collected:  04/01/18 0439    Specimen:  Blood Updated:  04/01/18 0551     Glucose 113 (H) mg/dL      BUN 7 (L) mg/dL      Creatinine 0.72 (L) mg/dL      Sodium 135 (L) mmol/L      Potassium 3.9 mmol/L      Chloride 98 mmol/L      CO2 22.5 mmol/L      Calcium 9.0 mg/dL      Total Protein 7.1 g/dL      Albumin 3.90 g/dL      ALT (SGPT) 40 U/L      AST (SGOT) 82 (H) U/L      Alkaline Phosphatase 240 (H) U/L      Total Bilirubin 1.6 (H) mg/dL      eGFR Non African Amer 110 mL/min/1.73      Globulin 3.2 gm/dL      A/G Ratio 1.2 g/dL      BUN/Creatinine Ratio 9.7     Anion Gap 14.5 mmol/L     TSH [[id number]]  (Normal) Collected:  04/01/18 0439    Specimen:  Blood Updated:  04/01/18 0551     TSH 3.700 mIU/mL     Protime-INR [[id number]]  (Normal) Collected:  04/01/18 0439    Specimen:  Blood Updated:  04/01/18 0520     Protime 12.8 Seconds      INR 0.96    Narrative:       Therapeutic Ranges for INR: 2.0-3.0 (PT 20-30)                              2.5-3.5 (PT 25-34)    CBC Auto Differential [[id number]]  (Abnormal) Collected:  04/01/18 0439    Specimen:  Blood Updated:  04/01/18 0452     WBC 17.42 (H) 10*3/mm3      RBC 3.79 (L) 10*6/mm3      Hemoglobin 13.7 (L) g/dL      Hematocrit 40.3 (L) %      .3 (H) fL      MCH 36.1 (H) pg      MCHC 34.0 g/dL      RDW 13.3 %      RDW-SD 53.1 fl      MPV 10.8 (H) fL      Platelets 244 10*3/mm3      Neutrophil % 83.3 (H) %      Lymphocyte % 7.7 (L) %      Monocyte % 7.0 %      Eosinophil % 0.9 %      Basophil % 0.7 %      Immature Grans % 0.4 %      Neutrophils, Absolute 14.50 (H) 10*3/mm3      Lymphocytes, Absolute 1.35 10*3/mm3      Monocytes, Absolute 1.22 (H) 10*3/mm3      Eosinophils, Absolute 0.16 10*3/mm3      Basophils, Absolute 0.12 10*3/mm3      Immature Grans, Absolute 0.07 (H) 10*3/mm3      nRBC 0.0 /100 WBC      Urinalysis, Microscopic Only - Urine, Clean Catch [[id number]]  (Abnormal) Collected:  03/31/18 1943    Specimen:  Urine from Urine, Clean Catch Updated:  03/31/18 2006     RBC, UA 0-2 (A) /HPF      WBC, UA None Seen /HPF      Bacteria, UA Trace (A) /HPF      Squamous Epithelial Cells, UA None Seen /HPF      Hyaline Casts, UA None Seen /LPF      Methodology Manual Light Microscopy    Urinalysis With / Culture If Indicated - Urine, Clean Catch [[id number]]  (Abnormal) Collected:  03/31/18 1943    Specimen:  Urine from Urine, Clean Catch Updated:  03/31/18 2006     Color, UA Yellow     Appearance, UA Clear     pH, UA 6.0     Specific Gravity, UA 1.015     Glucose, UA Negative     Ketones, UA Negative     Bilirubin, UA Negative     Blood, UA Trace (A)     Protein, UA Negative     Leuk Esterase, UA Negative     Nitrite, UA Negative     Urobilinogen, UA 0.2 E.U./dL    CBC & Differential [[id number]] Collected:  03/31/18 1751    Specimen:  Blood Updated:  03/31/18 1919    Narrative:       The following orders were created for panel order CBC & Differential.  Procedure                               Abnormality         Status                     ---------                               -----------         ------                     Scan Slide[[id number]]                                       Final result               CBC Auto Differential[[id number]]        Abnormal            Final result                 Please view results for these tests on the individual orders.    Scan Slide [[id number]] Collected:  03/31/18 1751    Specimen:  Blood Updated:  03/31/18 1919     Macrocytes Slight/1+     Stomatocytes Slight/1+     WBC Morphology Normal     Platelet Morphology Normal    CBC Auto Differential [[id number]]  (Abnormal) Collected:  03/31/18 1751    Specimen:  Blood Updated:  03/31/18 1903     WBC 16.01 (H) 10*3/mm3      RBC 3.90 (L) 10*6/mm3      Hemoglobin 14.2 g/dL      Hematocrit 41.5 (L) %      .4 (H) fL      MCH 36.4 (H)  pg      MCHC 34.2 g/dL      RDW 13.5 %      RDW-SD 53.4 fl      MPV 11.0 (H) fL      Platelets 279 10*3/mm3      Neutrophil % 85.4 (H) %      Lymphocyte % 9.3 (L) %      Monocyte % 3.9 %      Eosinophil % 0.4 %      Basophil % 0.6 %      Immature Grans % 0.4 %      Neutrophils, Absolute 13.68 (H) 10*3/mm3      Lymphocytes, Absolute 1.49 10*3/mm3      Monocytes, Absolute 0.62 10*3/mm3      Eosinophils, Absolute 0.06 (L) 10*3/mm3      Basophils, Absolute 0.10 10*3/mm3      Immature Grans, Absolute 0.06 (H) 10*3/mm3      nRBC 0.0 /100 WBC     Comprehensive Metabolic Panel [[id number]]  (Abnormal) Collected:  03/31/18 1751    Specimen:  Blood Updated:  03/31/18 1858     Glucose 140 (H) mg/dL      BUN 8 mg/dL      Creatinine 0.80 mg/dL      Sodium 133 (L) mmol/L      Potassium 3.5 mmol/L      Chloride 95 (L) mmol/L      CO2 23.4 mmol/L      Calcium 9.3 mg/dL      Total Protein 8.1 g/dL      Albumin 4.30 g/dL      ALT (SGPT) 37 U/L      AST (SGOT) 82 (H) U/L      Alkaline Phosphatase 253 (H) U/L      Total Bilirubin 0.8 mg/dL      eGFR Non African Amer 98 mL/min/1.73      Globulin 3.8 gm/dL      A/G Ratio 1.1 g/dL      BUN/Creatinine Ratio 10.0     Anion Gap 14.6 mmol/L     Troponin [[id number]]  (Normal) Collected:  03/31/18 1751    Specimen:  Blood Updated:  03/31/18 1837     Troponin T <0.010 ng/mL     Narrative:       Troponin T Reference Ranges:  Less than 0.03 ng/mL:    Negative for AMI  0.03 to 0.09 ng/mL:      Indeterminant for AMI  Greater than 0.09 ng/mL: Positive for AMI    Lipase [[id number]]  (Normal) Collected:  03/31/18 1751    Specimen:  Blood Updated:  03/31/18 1835     Lipase 25 U/L     Lactic Acid, Plasma [[id number]]  (Normal) Collected:  03/31/18 1751    Specimen:  Blood Updated:  03/31/18 1832     Lactate 1.4 mmol/L     aPTT [[id number]]  (Abnormal) Collected:  03/31/18 1751    Specimen:  Blood Updated:  03/31/18 1828     PTT 38.9 (H) seconds     Narrative:       PTT = The equivalent PTT values for the  therapeutic range of heparin levels at 0.1 to 0.7 U/ml are 53 to 110 seconds.    Protime-INR [[id number]]  (Normal) Collected:  03/31/18 1751    Specimen:  Blood Updated:  03/31/18 1828     Protime 12.9 Seconds      INR 0.97    Narrative:       Therapeutic Ranges for INR: 2.0-3.0 (PT 20-30)                              2.5-3.5 (PT 25-34)          Imaging Results (last 24 hours)     Procedure Component Value Units Date/Time    US Abdomen Complete [[id number]] Updated:  04/01/18 1101    CT Abdomen Pelvis With Contrast [[id number]] Collected:  04/01/18 0736     Updated:  04/01/18 0741    Narrative:       CT ABDOMEN AND PELVIS WITH INTRAVENOUS CONTRAST     HISTORY:  Diffuse abdominal pain worsening over the past day. Colonoscopy  yesterday.     TECHNIQUE:  Axial images were obtained with intravenous contrast. 100 cc of Isovue  was used. COMPARISON from 2/16/2018. 1 previous CT and no nuclear  cardiac studies over the past year. Radiation dose reduction techniques  were utilized, including automated exposure control and exposure  modulation based on body size.     ABDOMEN FINDINGS:  The lung bases are clear. There is no evidence of free air in the  peritoneal cavity. Fatty infiltration of the liver is noted. Shows  improvement since the scan of 2/16/2018. A small hepatic cyst is  unchanged. The spleen kidneys adrenals and pancreas have a normal  appearance. The gallbladder is distended and there is a small  pericholecystic fluid collection that was not seen on the previous exam.  There is a small partially calcified gallstone in the gallbladder neck  or cystic duct. The common bile duct is nondilated. FINDINGS worrisome  for early cholecystitis. There is no evidence of retroperitoneal  adenopathy or ascites and no distended bowel loops are seen.     PELVIS FINDINGS:  In the pelvis there is no evidence of adenopathy mass or fluid  collection. Normal appendix.       Impression:       Small cystic duct stone with  pericholecystic fluid  suspicious for early cholecystitis. Improvement in hepatic steatosis.  Normal appendix. No evidence of free air in the peritoneal cavity.     This report was finalized on 4/1/2018 7:39 AM by Dr. Jatin Braga MD.             ECG/EMG Results (most recent)     None          Medication Review:   I have reviewed the patient's current medication list    Current Facility-Administered Medications:   •  acetaminophen (TYLENOL) tablet 650 mg, 650 mg, Oral, Q4H PRN, Mihaela Leslie MD  •  amLODIPine (NORVASC) tablet 5 mg, 5 mg, Oral, Daily, Mihaela Leslie MD, 5 mg at 04/01/18 0802  •  b complex-C-folic acid capsule 1 mg, 1 capsule, Oral, Daily, Mihaela Leslie MD, 1 mg at 04/01/18 0804  •  budesonide-formoterol (SYMBICORT) 160-4.5 MCG/ACT inhaler 2 puff, 2 puff, Inhalation, BID, Mihaela Leslie MD, 2 puff at 04/01/18 0819  •  HYDROcodone-acetaminophen (NORCO) 5-325 MG per tablet 1 tablet, 1 tablet, Oral, Q4H PRN, Mihaela Leslie MD, 1 tablet at 04/01/18 1144  •  HYDROmorphone (DILAUDID) injection 0.5 mg, 0.5 mg, Intravenous, Q2H PRN, Mihaela Leslie MD, 0.5 mg at 04/01/18 0310  •  nicotine (NICODERM CQ) 21 MG/24HR patch 1 patch, 1 patch, Transdermal, Q24H, Mihaela Leslie MD, 1 patch at 03/31/18 2214  •  ondansetron (ZOFRAN) tablet 4 mg, 4 mg, Oral, Q6H PRN **OR** ondansetron ODT (ZOFRAN-ODT) disintegrating tablet 4 mg, 4 mg, Oral, Q6H PRN **OR** ondansetron (ZOFRAN) injection 4 mg, 4 mg, Intravenous, Q6H PRN, Mihaela Leslie MD  •  pantoprazole (PROTONIX) EC tablet 40 mg, 40 mg, Oral, QAM, Mihaela Leslie MD, 40 mg at 04/01/18 0632  •  piperacillin-tazobactam (ZOSYN) 4.5 g/100 mL 0.9% NS IVPB (mbp), 4.5 g, Intravenous, Q8H, Mihaela Leslie MD, 4.5 g at 04/01/18 1147  •  sennosides-docusate sodium (SENOKOT-S) 8.6-50 MG tablet 2 tablet, 2 tablet, Oral, Nightly PRN, Mihaela Leslie MD  •  sodium chloride 0.9 % flush 1-10 mL, 1-10 mL, Intravenous, PRN,  Mihaela Leslie MD  •  Insert peripheral IV, , , Once **AND** sodium chloride 0.9 % flush 10 mL, 10 mL, Intravenous, PRN, Adriana Gurrola PA-C  •  sodium chloride 0.9 % infusion 40 mL, 40 mL, Intravenous, PRN, Mihaela Leslie MD  •  sodium chloride 0.9 % infusion, 100 mL/hr, Intravenous, Continuous, Jaylin Matthew MD, Last Rate: 100 mL/hr at 04/01/18 1144, 100 mL/hr at 04/01/18 1144      Assessment/Plan     1. Acute cholecystitis:   Patient's bili was slightly elevated this AM, h/o elevated LFTs that has been W/U by Dr. Johnson's in the past. CT scan with possible small stone in the GB neck/cystic duct. Dr. Tamez consulted, I spoke with her today. She ordered a HIDA scan today and if positive will plan to take the patient to the OR tomorrow for cholecystectomy. No signs of pancreatitis currently. Patient WBC remains elevated. Continue zosyn. Continue dilaudid and norco PRN for pain and zofran for nausea. Await HIDA results. Patient remains NPO, will start IVFs.      2. HTN: BP was WNL here last checked on home dose Norvasc. Prior elevations likely secondary to pain here. Monitor.       3. COPD: Patient continued on home Symbicort. No acute issues here. Will order albuterol PRN in case needed eugenio-op.    4. GERD: On PPI, no acute issues.     5. Plantar facititis  Stretches, nsaids when ok with surgery, can be addressed outpatient by PCP.     6. H/O constipation and cold intolerance: TSH is WNL.    7. H/O Heavy EtOH use: Patient without alcohol now for 4-6 weeks so no concerns for withdrawal here.       Plan for disposition: Home when able.    Jaylin Matthew MD  04/01/18  12:52 PM

## 2018-04-01 NOTE — H&P
[facility] HOSPITALIST     No Known Provider    CHIEF COMPLAINT: Abdominal Pain    HISTORY OF PRESENT ILLNESS:      63 yo WM w/ PMH of COPD, HTN, Etoh Abuse, who presented w/ Epigastric and ruq pain that has been waxing and waning since pt ate pan fried burgers, and deep fried french fries after being NPO, and not eating for 31 hours eugenio-procedurally for his colonoscopy w/ Dr. Stewart on 3/30.   At worst pain is 10/10, best 6-7 out of 10. Attributes it to holding his nexium for the colonscopy. But admits that this pain has occurred on several other occasions, but not as severe. Does not recall if associated with fatty foods in past. Other associated symptoms have been n/v, and anorexia.    Pt stopped drinking about 4 weeks ago.     ROS: positive for Cold intolerance, constipation, plantar fascitis, alcoholic peripheral neuropathy, insomnia       Past Medical History:   Diagnosis Date   • COPD (chronic obstructive pulmonary disease)    • GERD (gastroesophageal reflux disease)    • Hypercholesteremia    • Hypertension      Past Surgical History:   Procedure Laterality Date   • COLONOSCOPY       Family History   Problem Relation Age of Onset   • Cancer Father      Social History   Substance Use Topics   • Smoking status: Current Every Day Smoker     Packs/day: 2.00     Years: 50.00   • Smokeless tobacco: Never Used   • Alcohol use No      Comment: quit 3/1/18     Prescriptions Prior to Admission   Medication Sig Dispense Refill Last Dose   • amLODIPine (NORVASC) 5 MG tablet Take 5 mg by mouth Daily.   3/31/2018 at 0800   • aspirin 81 MG EC tablet Take 81 mg by mouth Daily.   Past Week at Unknown time   • budesonide-formoterol (SYMBICORT) 160-4.5 MCG/ACT inhaler Inhale 2 puffs 2 (Two) Times a Day As Needed.   3/30/2018 at Unknown time   • esomeprazole (nexIUM) 20 MG capsule Take 20 mg by mouth Every Morning Before Breakfast.   3/31/2018 at 0800     Allergies:  Erythromycin    REVIEW OF SYSTEMS:  Please  "see the above history of present illness for pertinent positives and negatives.  The remainder of the patient's systems have been reviewed and are negative.     Vital Signs  Temp:  [97.3 °F (36.3 °C)-97.9 °F (36.6 °C)] 97.9 °F (36.6 °C)  Heart Rate:  [61-71] 68  Resp:  [16-20] 16  BP: (140-161)/(75-88) 161/84    Flowsheet Rows    Flowsheet Row First Filed Value   Admission Height 167 cm (65.75\") Documented at 03/31/2018 1737   Admission Weight 58.1 kg (128 lb) Documented at 03/31/2018 1737           Physical Exam:  Physical Exam   Constitutional: Patient appears well-developed and thin male who appears older than biological age and in no acute distress   HEENT:   Head: Normocephalic and atraumatic.   Eyes:  Pupils are equal, round, and reactive to light. EOM are intact. Sclera are anicteric and non-injected.  Mouth and Throat: Patient has dry mucous membranes. Oropharynx is clear of any erythema or exudate.     Neck: Neck supple. No JVD present. No thyromegaly present. No lymphadenopathy present.  Cardiovascular: Regular rate, regular rhythm, S1 normal and S2 normal.  Exam reveals no gallop and no friction rub.  No murmur heard.  Pulmonary/Chest: Small end expiratory wheezes in the rt upper lung fields No respiratory distress. No rhonchi. No rales.   Abdominal: Soft. Bowel sounds are normal. Mild distension. There is  hepatomegaly. Very tender to light palpation in the epigastric region, pos maldonado's sign, not an acute abdomean  Musculoskeletal: Normal Muscle tone  Extremities: No edema. Pulses are palpable in all 4 extremities.  Neurological: Patient is alert and oriented to person, place, and time. Cranial nerves II-XII are grossly intact with no focal deficits.  Skin: Skin is warm. No rash noted. Nails show no clubbing.  No cyanosis or erythema.  Pysch: chatty, pleasant, but tangential historian     Results Review:    I reviewed the patient's new clinical results.  Lab Results (most recent)     Procedure " Component Value Units Date/Time    Urinalysis, Microscopic Only - Urine, Clean Catch [[id number]]  (Abnormal) Collected:  03/31/18 1943    Specimen:  Urine from Urine, Clean Catch Updated:  03/31/18 2006     RBC, UA 0-2 (A) /HPF      WBC, UA None Seen /HPF      Bacteria, UA Trace (A) /HPF      Squamous Epithelial Cells, UA None Seen /HPF      Hyaline Casts, UA None Seen /LPF      Methodology Manual Light Microscopy    Urinalysis With / Culture If Indicated - Urine, Clean Catch [[id number]]  (Abnormal) Collected:  03/31/18 1943    Specimen:  Urine from Urine, Clean Catch Updated:  03/31/18 2006     Color, UA Yellow     Appearance, UA Clear     pH, UA 6.0     Specific Gravity, UA 1.015     Glucose, UA Negative     Ketones, UA Negative     Bilirubin, UA Negative     Blood, UA Trace (A)     Protein, UA Negative     Leuk Esterase, UA Negative     Nitrite, UA Negative     Urobilinogen, UA 0.2 E.U./dL    CBC & Differential [[id number]] Collected:  03/31/18 1751    Specimen:  Blood Updated:  03/31/18 1919    Narrative:       The following orders were created for panel order CBC & Differential.  Procedure                               Abnormality         Status                     ---------                               -----------         ------                     Scan Slide[[id number]]                                       Final result               CBC Auto Differential[[id number]]        Abnormal            Final result                 Please view results for these tests on the individual orders.    Scan Slide [[id number]] Collected:  03/31/18 1751    Specimen:  Blood Updated:  03/31/18 1919     Macrocytes Slight/1+     Stomatocytes Slight/1+     WBC Morphology Normal     Platelet Morphology Normal    CBC Auto Differential [[id number]]  (Abnormal) Collected:  03/31/18 1751    Specimen:  Blood Updated:  03/31/18 1903     WBC 16.01 (H) 10*3/mm3      RBC 3.90 (L) 10*6/mm3      Hemoglobin 14.2 g/dL      Hematocrit 41.5 (L) %       .4 (H) fL      MCH 36.4 (H) pg      MCHC 34.2 g/dL      RDW 13.5 %      RDW-SD 53.4 fl      MPV 11.0 (H) fL      Platelets 279 10*3/mm3      Neutrophil % 85.4 (H) %      Lymphocyte % 9.3 (L) %      Monocyte % 3.9 %      Eosinophil % 0.4 %      Basophil % 0.6 %      Immature Grans % 0.4 %      Neutrophils, Absolute 13.68 (H) 10*3/mm3      Lymphocytes, Absolute 1.49 10*3/mm3      Monocytes, Absolute 0.62 10*3/mm3      Eosinophils, Absolute 0.06 (L) 10*3/mm3      Basophils, Absolute 0.10 10*3/mm3      Immature Grans, Absolute 0.06 (H) 10*3/mm3      nRBC 0.0 /100 WBC     Comprehensive Metabolic Panel [[id number]]  (Abnormal) Collected:  03/31/18 1751    Specimen:  Blood Updated:  03/31/18 1858     Glucose 140 (H) mg/dL      BUN 8 mg/dL      Creatinine 0.80 mg/dL      Sodium 133 (L) mmol/L      Potassium 3.5 mmol/L      Chloride 95 (L) mmol/L      CO2 23.4 mmol/L      Calcium 9.3 mg/dL      Total Protein 8.1 g/dL      Albumin 4.30 g/dL      ALT (SGPT) 37 U/L      AST (SGOT) 82 (H) U/L      Alkaline Phosphatase 253 (H) U/L      Total Bilirubin 0.8 mg/dL      eGFR Non African Amer 98 mL/min/1.73      Globulin 3.8 gm/dL      A/G Ratio 1.1 g/dL      BUN/Creatinine Ratio 10.0     Anion Gap 14.6 mmol/L     Troponin [[id number]]  (Normal) Collected:  03/31/18 1751    Specimen:  Blood Updated:  03/31/18 1837     Troponin T <0.010 ng/mL     Narrative:       Troponin T Reference Ranges:  Less than 0.03 ng/mL:    Negative for AMI  0.03 to 0.09 ng/mL:      Indeterminant for AMI  Greater than 0.09 ng/mL: Positive for AMI    Lipase [[id number]]  (Normal) Collected:  03/31/18 1751    Specimen:  Blood Updated:  03/31/18 1835     Lipase 25 U/L     Lactic Acid, Plasma [[id number]]  (Normal) Collected:  03/31/18 1751    Specimen:  Blood Updated:  03/31/18 1832     Lactate 1.4 mmol/L     aPTT [[id number]]  (Abnormal) Collected:  03/31/18 1751    Specimen:  Blood Updated:  03/31/18 1828     PTT 38.9 (H) seconds     Narrative:       PTT =  The equivalent PTT values for the therapeutic range of heparin levels at 0.1 to 0.7 U/ml are 53 to 110 seconds.    Protime-INR [[id number]]  (Normal) Collected:  03/31/18 1751    Specimen:  Blood Updated:  03/31/18 1828     Protime 12.9 Seconds      INR 0.97    Narrative:       Therapeutic Ranges for INR: 2.0-3.0 (PT 20-30)                              2.5-3.5 (PT 25-34)          Imaging Results (most recent)     Procedure Component Value Units Date/Time    CT Abdomen Pelvis With Contrast [[id number]] Updated:  03/31/18 1922        reviewed    ECG/EMG Results (most recent)     None        reviewed    Assessment/Plan     Acute cholecystisis  - LFT's normal, no signs of choledocolithasis  - Leukocystosis, on zosyn  - US stat in AM  - High suspicion based on history and exam  - Management per Dr. Tamez  - Pain moderately controlled with 0.5mg dilaudid, can increase to 1mg  - did not tolerate oral pain medication    HTN  - , on home amolodipine  - pain control    Positive RUFUS  - pt is tangential historian, but of ellicited history, no symptoms that raise suspicion of acute rheumatologic disease requiring further inpt workup    Plantar facititis  - Stretches, nsaids when ok with surgery    Constipation and cold intolerance  - Checking TSH    Pt needs to follow up with a PCP to address the numerous issues in the review of systems, but none are acute enough in nature requiring hospital evalution    I discussed the patients findings and my recommendations with patient.     Mihaela Leslie MD  04/01/18  1:54 AM

## 2018-04-01 NOTE — PLAN OF CARE
Problem: Patient Care Overview  Goal: Discharge Needs Assessment  Outcome: Ongoing (interventions implemented as appropriate)   04/01/18 1137   Discharge Needs Assessment   Discharge Coordination/Progress CM obtained permission to speak with patient's wife at bedside. Patient is independent in ADLs, still drives. No HH, DME, 02. Denies information on living [place]. Plans home with spouse when medically stable. Uses [facility], denies trouble paying for medications. Denies discharge needs at this time. Will continue to follow.

## 2018-04-01 NOTE — CONSULTS
General Surgery      Patient Care Team:  No Known Provider as PCP - General    CHIEF COMPLAINT: Abdominal pain, nausea vomiting    HISTORY OF PRESENT ILLNESS: Patient is a 62-year-old white male with multiple medical problems including hypertension, COPD, EtOH abuse who presented to [facility] emergency room with acute onset of right upper quadrant and epigastric abdominal pain.  He had his colonoscopy with polypectomy by Dr. Stewart at this facility on 3/30/18.  He reports yesterday he ate french fries and then fried burgers and shortly thereafter developed severe epigastric and right upper quadrant abdominal pain with multiple episodes of nausea and vomiting.  He also complains of anorexia.  He presented to [facility] emergency room with further workup was done by the ER physician including CT scan and labs.  I have reviewed the images personally.    Patient reports having problems with right upper quadrant abdominal pain on and off for the last month.  Outpatient workup done by his PCP through [facility] showed elevated liver enzymes, hepatic protocol CT scan done 2/16/18 which showed marked hepatomegaly, diffuse hepatic steatosis, no evidence of cirrhosis and apparently a normal gallbladder, no biliary dilatation.  He did have elevated AST, ALT and alkaline phosphatase but her total bilirubin was normal.  He had workup done by gastroenterology including RUFUS, ferritin level both of which were elevated, acute hepatitis panel which was nonreactive.    He denies any fevers, chills, alternating bowel movements, melena, hematochezia or urinary symptoms.    He does have a history of EtOH use but quit drinking 4 weeks ago, prior to that would drink 2.3 ounces of risky 3-4 times a week.  He is a heavy smoker 2 packs per day ×50 years.      I have reviewed previous images including CT scan from yesterday and ultrasound that was done this morning.      Past Medical History:   Diagnosis Date   • COPD (chronic  "obstructive pulmonary disease)    • GERD (gastroesophageal reflux disease)    • Hypercholesteremia    • Hypertension      Past Surgical History:   Procedure Laterality Date   • COLONOSCOPY       Family History   Problem Relation Age of Onset   • Cancer Father      Social History   Substance Use Topics   • Smoking status: Current Every Day Smoker     Packs/day: 2.00     Years: 50.00   • Smokeless tobacco: Never Used   • Alcohol use No      Comment: quit 3/1/18     Prescriptions Prior to Admission   Medication Sig Dispense Refill Last Dose   • amLODIPine (NORVASC) 5 MG tablet Take 5 mg by mouth Daily.   3/31/2018 at 0800   • aspirin 81 MG EC tablet Take 81 mg by mouth Daily.   Past Week at Unknown time   • budesonide-formoterol (SYMBICORT) 160-4.5 MCG/ACT inhaler Inhale 2 puffs 2 (Two) Times a Day As Needed.   3/30/2018 at Unknown time   • esomeprazole (nexIUM) 20 MG capsule Take 20 mg by mouth Every Morning Before Breakfast.   3/31/2018 at 0800     Allergies:  Erythromycin    REVIEW OF SYSTEMS:  Please see the above history of present illness for pertinent positives and negatives.  The remainder of the patient's systems have been reviewed and are negative.     Vital Signs  Temp:  [97.3 °F (36.3 °C)-98.1 °F (36.7 °C)] 98.1 °F (36.7 °C)  Heart Rate:  [61-71] 64  Resp:  [16-20] 16  BP: (140-161)/(75-88) 161/84    Flowsheet Rows    Flowsheet Row First Filed Value   Admission Height 167 cm (65.75\") Documented at 03/31/2018 1737   Admission Weight 58.1 kg (128 lb) Documented at 03/31/2018 1737           Physical Exam:  Physical Exam   Constitutional: Patient appears well-developed and well-nourished and in no acute distress   HEENT:   Head: Normocephalic and atraumatic.   Eyes:  Pupils are equal, round, and reactive to light.  Mouth and Throat: Patient has moist mucous membranes. Oropharynx is clear of any erythema or exudate.     Neck: Neck supple. No JVD present. No thyromegaly present. No lymphadenopathy " present.  Cardiovascular: Regular rate, regular rhythm.  Pulmonary/Chest: Lungs are clear to auscultation bilaterally.  Abdominal: Soft, nondistended, tender right upper quadrant.  Positive bowel sounds in all 4 quadrants.   Musculoskeletal: Normal posture.  Extremities: No edema. Pulses are palpable in all 4 extremities.  Neurological: Patient is alert and oriented.  Psychological:   Mood and behavior appropriate.  Skin: Skin is warm and dry.     Results Review:    I reviewed the patient's new clinical results.    Results from last 7 days  Lab Units 04/01/18 0439   WBC 10*3/mm3 17.42*   HEMOGLOBIN g/dL 13.7*   HEMATOCRIT % 40.3*   PLATELETS 10*3/mm3 244       Results from last 7 days  Lab Units 04/01/18 0439   SODIUM mmol/L 135*   POTASSIUM mmol/L 3.9   CHLORIDE mmol/L 98   CO2 mmol/L 22.5   BUN mg/dL 7*   CREATININE mg/dL 0.72*   GLUCOSE mg/dL 113*   CALCIUM mg/dL 9.0     Lab Results (most recent)     Procedure Component Value Units Date/Time    Comprehensive Metabolic Panel [[id number]]  (Abnormal) Collected:  04/01/18 0439    Specimen:  Blood Updated:  04/01/18 0551     Glucose 113 (H) mg/dL      BUN 7 (L) mg/dL      Creatinine 0.72 (L) mg/dL      Sodium 135 (L) mmol/L      Potassium 3.9 mmol/L      Chloride 98 mmol/L      CO2 22.5 mmol/L      Calcium 9.0 mg/dL      Total Protein 7.1 g/dL      Albumin 3.90 g/dL      ALT (SGPT) 40 U/L      AST (SGOT) 82 (H) U/L      Alkaline Phosphatase 240 (H) U/L      Total Bilirubin 1.6 (H) mg/dL      eGFR Non African Amer 110 mL/min/1.73      Globulin 3.2 gm/dL      A/G Ratio 1.2 g/dL      BUN/Creatinine Ratio 9.7     Anion Gap 14.5 mmol/L     TSH [[id number]]  (Normal) Collected:  04/01/18 0439    Specimen:  Blood Updated:  04/01/18 0551     TSH 3.700 mIU/mL     Protime-INR [[id number]]  (Normal) Collected:  04/01/18 0439    Specimen:  Blood Updated:  04/01/18 0520     Protime 12.8 Seconds      INR 0.96    Narrative:       Therapeutic Ranges for INR: 2.0-3.0 (PT 20-30)                               2.5-3.5 (PT 25-34)    CBC Auto Differential [[id number]]  (Abnormal) Collected:  04/01/18 0439    Specimen:  Blood Updated:  04/01/18 0452     WBC 17.42 (H) 10*3/mm3      RBC 3.79 (L) 10*6/mm3      Hemoglobin 13.7 (L) g/dL      Hematocrit 40.3 (L) %      .3 (H) fL      MCH 36.1 (H) pg      MCHC 34.0 g/dL      RDW 13.3 %      RDW-SD 53.1 fl      MPV 10.8 (H) fL      Platelets 244 10*3/mm3      Neutrophil % 83.3 (H) %      Lymphocyte % 7.7 (L) %      Monocyte % 7.0 %      Eosinophil % 0.9 %      Basophil % 0.7 %      Immature Grans % 0.4 %      Neutrophils, Absolute 14.50 (H) 10*3/mm3      Lymphocytes, Absolute 1.35 10*3/mm3      Monocytes, Absolute 1.22 (H) 10*3/mm3      Eosinophils, Absolute 0.16 10*3/mm3      Basophils, Absolute 0.12 10*3/mm3      Immature Grans, Absolute 0.07 (H) 10*3/mm3      nRBC 0.0 /100 WBC     Urinalysis, Microscopic Only - Urine, Clean Catch [[id number]]  (Abnormal) Collected:  03/31/18 1943    Specimen:  Urine from Urine, Clean Catch Updated:  03/31/18 2006     RBC, UA 0-2 (A) /HPF      WBC, UA None Seen /HPF      Bacteria, UA Trace (A) /HPF      Squamous Epithelial Cells, UA None Seen /HPF      Hyaline Casts, UA None Seen /LPF      Methodology Manual Light Microscopy    Urinalysis With / Culture If Indicated - Urine, Clean Catch [[id number]]  (Abnormal) Collected:  03/31/18 1943    Specimen:  Urine from Urine, Clean Catch Updated:  03/31/18 2006     Color, UA Yellow     Appearance, UA Clear     pH, UA 6.0     Specific Gravity, UA 1.015     Glucose, UA Negative     Ketones, UA Negative     Bilirubin, UA Negative     Blood, UA Trace (A)     Protein, UA Negative     Leuk Esterase, UA Negative     Nitrite, UA Negative     Urobilinogen, UA 0.2 E.U./dL    CBC & Differential [[id number]] Collected:  03/31/18 1751    Specimen:  Blood Updated:  03/31/18 1919    Narrative:       The following orders were created for panel order CBC & Differential.  Procedure                                Abnormality         Status                     ---------                               -----------         ------                     Scan Slide[[id number]]                                       Final result               CBC Auto Differential[[id number]]        Abnormal            Final result                 Please view results for these tests on the individual orders.    Scan Slide [[id number]] Collected:  03/31/18 1751    Specimen:  Blood Updated:  03/31/18 1919     Macrocytes Slight/1+     Stomatocytes Slight/1+     WBC Morphology Normal     Platelet Morphology Normal    CBC Auto Differential [[id number]]  (Abnormal) Collected:  03/31/18 1751    Specimen:  Blood Updated:  03/31/18 1903     WBC 16.01 (H) 10*3/mm3      RBC 3.90 (L) 10*6/mm3      Hemoglobin 14.2 g/dL      Hematocrit 41.5 (L) %      .4 (H) fL      MCH 36.4 (H) pg      MCHC 34.2 g/dL      RDW 13.5 %      RDW-SD 53.4 fl      MPV 11.0 (H) fL      Platelets 279 10*3/mm3      Neutrophil % 85.4 (H) %      Lymphocyte % 9.3 (L) %      Monocyte % 3.9 %      Eosinophil % 0.4 %      Basophil % 0.6 %      Immature Grans % 0.4 %      Neutrophils, Absolute 13.68 (H) 10*3/mm3      Lymphocytes, Absolute 1.49 10*3/mm3      Monocytes, Absolute 0.62 10*3/mm3      Eosinophils, Absolute 0.06 (L) 10*3/mm3      Basophils, Absolute 0.10 10*3/mm3      Immature Grans, Absolute 0.06 (H) 10*3/mm3      nRBC 0.0 /100 WBC     Comprehensive Metabolic Panel [[id number]]  (Abnormal) Collected:  03/31/18 1751    Specimen:  Blood Updated:  03/31/18 1858     Glucose 140 (H) mg/dL      BUN 8 mg/dL      Creatinine 0.80 mg/dL      Sodium 133 (L) mmol/L      Potassium 3.5 mmol/L      Chloride 95 (L) mmol/L      CO2 23.4 mmol/L      Calcium 9.3 mg/dL      Total Protein 8.1 g/dL      Albumin 4.30 g/dL      ALT (SGPT) 37 U/L      AST (SGOT) 82 (H) U/L      Alkaline Phosphatase 253 (H) U/L      Total Bilirubin 0.8 mg/dL      eGFR Non African Amer 98 mL/min/1.73      Globulin  3.8 gm/dL      A/G Ratio 1.1 g/dL      BUN/Creatinine Ratio 10.0     Anion Gap 14.6 mmol/L     Troponin [[id number]]  (Normal) Collected:  03/31/18 1751    Specimen:  Blood Updated:  03/31/18 1837     Troponin T <0.010 ng/mL     Narrative:       Troponin T Reference Ranges:  Less than 0.03 ng/mL:    Negative for AMI  0.03 to 0.09 ng/mL:      Indeterminant for AMI  Greater than 0.09 ng/mL: Positive for AMI    Lipase [[id number]]  (Normal) Collected:  03/31/18 1751    Specimen:  Blood Updated:  03/31/18 1835     Lipase 25 U/L     Lactic Acid, Plasma [[id number]]  (Normal) Collected:  03/31/18 1751    Specimen:  Blood Updated:  03/31/18 1832     Lactate 1.4 mmol/L     aPTT [[id number]]  (Abnormal) Collected:  03/31/18 1751    Specimen:  Blood Updated:  03/31/18 1828     PTT 38.9 (H) seconds     Narrative:       PTT = The equivalent PTT values for the therapeutic range of heparin levels at 0.1 to 0.7 U/ml are 53 to 110 seconds.    Protime-INR [[id number]]  (Normal) Collected:  03/31/18 1751    Specimen:  Blood Updated:  03/31/18 1828     Protime 12.9 Seconds      INR 0.97    Narrative:       Therapeutic Ranges for INR: 2.0-3.0 (PT 20-30)                              2.5-3.5 (PT 25-34)          Imaging Results (most recent)     Procedure Component Value Units Date/Time    US Abdomen Complete [[id number]] Updated:  04/01/18 1101    CT Abdomen Pelvis With Contrast [[id number]] Collected:  04/01/18 0736     Updated:  04/01/18 0741    Narrative:       CT ABDOMEN AND PELVIS WITH INTRAVENOUS CONTRAST     HISTORY:  Diffuse abdominal pain worsening over the past day. Colonoscopy  yesterday.     TECHNIQUE:  Axial images were obtained with intravenous contrast. 100 cc of Isovue  was used. COMPARISON from 2/16/2018. 1 previous CT and no nuclear  cardiac studies over the past year. Radiation dose reduction techniques  were utilized, including automated exposure control and exposure  modulation based on body size.     ABDOMEN FINDINGS:  The  lung bases are clear. There is no evidence of free air in the  peritoneal cavity. Fatty infiltration of the liver is noted. Shows  improvement since the scan of 2/16/2018. A small hepatic cyst is  unchanged. The spleen kidneys adrenals and pancreas have a normal  appearance. The gallbladder is distended and there is a small  pericholecystic fluid collection that was not seen on the previous exam.  There is a small partially calcified gallstone in the gallbladder neck  or cystic duct. The common bile duct is nondilated. FINDINGS worrisome  for early cholecystitis. There is no evidence of retroperitoneal  adenopathy or ascites and no distended bowel loops are seen.     PELVIS FINDINGS:  In the pelvis there is no evidence of adenopathy mass or fluid  collection. Normal appendix.       Impression:       Small cystic duct stone with pericholecystic fluid  suspicious for early cholecystitis. Improvement in hepatic steatosis.  Normal appendix. No evidence of free air in the peritoneal cavity.     This report was finalized on 4/1/2018 7:39 AM by Dr. Jatin Braga MD.           reviewed    ECG/EMG Results (most recent)     None        reviewed    Assessment/Plan     Abdominal pain  Nausea and vomiting  Acute calculus cholecystitis  History of alcoholic use  Tobacco abuse  Leukocytosis    Would recommend keep nothing by mouth/bowel rest  Continue IV fluids  Continue IV antibiotics  Continue PPI for GI prophylaxis  Will check  HIDA scan  Will make further recommendations pending HIDA scan results        I discussed the patients findings and my recommendations with patient, his wife and Dr. Busby.     Marley Tamez MD  04/01/18  12:56 PM

## 2018-04-01 NOTE — NURSING NOTE
Discharge Planning Assessment   Morenita Kan     Patient Name: Babak Herrera  MRN: [id number]  Today's Date: 4/1/2018    Admit Date: 3/31/2018          Discharge Needs Assessment     Row Name 04/01/18 1131       Living Environment    Able to Return to Prior Arrangements yes       Resource/Environmental Concerns    Transportation Concerns car, none       Discharge Needs Assessment    Concerns to be Addressed denies needs/concerns at this time    Discharge Coordination/Progress CM obtained permission to speak with patient's wife at bedside.  Patient is independent in ADLs, still drives.  No HH, DME, 02.  Denies information on living [place].  Plans home with spouse when medically stable.  Uses [facility] [place], denies trouble paying for medications. Denies discharge needs at this time.  Will continue to follow.             Discharge Plan     Row Name 04/01/18 1134       Plan    Plan home with spouse when medically stable    Patient/Family in Agreement with Plan yes    Plan Comments CM obtained permission to speak with patient's wife at bedside.  Patient is independent in ADLs, still drives.  No HH, DME, 02.  Denies information on living [place].  Plans home with spouse when medically stable.  Uses [facility] [place], denies trouble paying for medications. Denies discharge needs at this time.  Will continue to follow.         Destination     No service coordination in this encounter.      Durable Medical Equipment     No service coordination in this encounter.      Dialysis/Infusion     No service coordination in this encounter.      Home Medical Care     No service coordination in this encounter.      Social Care     No service coordination in this encounter.                Demographic Summary    No documentation.           Functional Status    No documentation.           Psychosocial    No documentation.           Abuse/Neglect    No documentation.           Legal    No documentation.           Substance Abuse    No  documentation.           Patient Forms    No documentation.         George Tapia RN

## 2018-04-01 NOTE — PROGRESS NOTES
General Surgery Note    Hida scan reviewed -  Acute cholecystitis.  Pros and cons/risks and benefits discussed with patient.   Will proceed with Laparoscopic cholecystectomy in am.

## 2018-04-02 ENCOUNTER — ANESTHESIA (OUTPATIENT)
Dept: PERIOP | Facility: HOSPITAL | Age: 63
End: 2018-04-02

## 2018-04-02 ENCOUNTER — ANESTHESIA EVENT (OUTPATIENT)
Dept: PERIOP | Facility: HOSPITAL | Age: 63
End: 2018-04-02

## 2018-04-02 PROBLEM — K81.0 ACUTE CHOLECYSTITIS: Status: ACTIVE | Noted: 2018-04-02

## 2018-04-02 PROBLEM — R10.11 RIGHT UPPER QUADRANT ABDOMINAL PAIN: Status: ACTIVE | Noted: 2018-04-02

## 2018-04-02 LAB
ALBUMIN SERPL-MCNC: 3.4 G/DL (ref 3.5–5.2)
ALBUMIN/GLOB SERPL: 1.1 G/DL
ALP SERPL-CCNC: 208 U/L (ref 40–129)
ALT SERPL W P-5'-P-CCNC: 31 U/L (ref 5–41)
AMYLASE SERPL-CCNC: 40 U/L (ref 28–100)
ANION GAP SERPL CALCULATED.3IONS-SCNC: 13.4 MMOL/L
APTT PPP: 45.7 SECONDS (ref 24.3–38.1)
AST SERPL-CCNC: 46 U/L (ref 5–40)
BASOPHILS # BLD AUTO: 0.07 10*3/MM3 (ref 0–0.2)
BASOPHILS NFR BLD AUTO: 0.5 % (ref 0–2)
BILIRUB SERPL-MCNC: 1.7 MG/DL (ref 0.2–1.2)
BUN BLD-MCNC: 6 MG/DL (ref 8–23)
BUN/CREAT SERPL: 8 (ref 7–25)
CALCIUM SPEC-SCNC: 8.7 MG/DL (ref 8.8–10.5)
CHLORIDE SERPL-SCNC: 100 MMOL/L (ref 98–107)
CO2 SERPL-SCNC: 23.6 MMOL/L (ref 22–29)
CREAT BLD-MCNC: 0.75 MG/DL (ref 0.76–1.27)
DEPRECATED RDW RBC AUTO: 53.2 FL (ref 37–54)
EOSINOPHIL # BLD AUTO: 0.12 10*3/MM3 (ref 0.1–0.3)
EOSINOPHIL NFR BLD AUTO: 0.9 % (ref 0–4)
ERYTHROCYTE [DISTWIDTH] IN BLOOD BY AUTOMATED COUNT: 13.5 % (ref 11.5–14.5)
GFR SERPL CREATININE-BSD FRML MDRD: 105 ML/MIN/1.73
GLOBULIN UR ELPH-MCNC: 3.1 GM/DL
GLUCOSE BLD-MCNC: 87 MG/DL (ref 65–99)
HCT VFR BLD AUTO: 37.7 % (ref 42–52)
HGB BLD-MCNC: 12.6 G/DL (ref 14–18)
IMM GRANULOCYTES # BLD: 0.05 10*3/MM3 (ref 0–0.03)
IMM GRANULOCYTES NFR BLD: 0.4 % (ref 0–0.5)
INR PPP: 1.14 (ref 0.9–1.1)
LIPASE SERPL-CCNC: 19 U/L (ref 13–60)
LYMPHOCYTES # BLD AUTO: 1.67 10*3/MM3 (ref 0.6–4.8)
LYMPHOCYTES NFR BLD AUTO: 12.2 % (ref 20–45)
MCH RBC QN AUTO: 35.8 PG (ref 27–31)
MCHC RBC AUTO-ENTMCNC: 33.4 G/DL (ref 31–37)
MCV RBC AUTO: 107.1 FL (ref 80–94)
MONOCYTES # BLD AUTO: 1.3 10*3/MM3 (ref 0–1)
MONOCYTES NFR BLD AUTO: 9.5 % (ref 3–8)
NEUTROPHILS # BLD AUTO: 10.47 10*3/MM3 (ref 1.5–8.3)
NEUTROPHILS NFR BLD AUTO: 76.5 % (ref 45–70)
NRBC BLD MANUAL-RTO: 0 /100 WBC (ref 0–0)
PLATELET # BLD AUTO: 210 10*3/MM3 (ref 140–500)
PMV BLD AUTO: 11.6 FL (ref 7.4–10.4)
POTASSIUM BLD-SCNC: 3.6 MMOL/L (ref 3.5–5.2)
PROT SERPL-MCNC: 6.5 G/DL (ref 6–8.5)
PROTHROMBIN TIME: 14.7 SECONDS (ref 12.1–15)
RBC # BLD AUTO: 3.52 10*6/MM3 (ref 4.7–6.1)
SODIUM BLD-SCNC: 137 MMOL/L (ref 136–145)
WBC NRBC COR # BLD: 13.68 10*3/MM3 (ref 4.8–10.8)

## 2018-04-02 PROCEDURE — 94799 UNLISTED PULMONARY SVC/PX: CPT

## 2018-04-02 PROCEDURE — 83690 ASSAY OF LIPASE: CPT | Performed by: SURGERY

## 2018-04-02 PROCEDURE — 25010000002 HYDROMORPHONE HCL PF 500 MG/50ML SOLUTION: Performed by: NURSE ANESTHETIST, CERTIFIED REGISTERED

## 2018-04-02 PROCEDURE — 0FT40ZZ RESECTION OF GALLBLADDER, OPEN APPROACH: ICD-10-PCS | Performed by: SURGERY

## 2018-04-02 PROCEDURE — 25010000002 PIPERACILLIN-TAZOBACTAM: Performed by: INTERNAL MEDICINE

## 2018-04-02 PROCEDURE — 25010000002 PIPERACILLIN SOD-TAZOBACTAM PER 1 G: Performed by: INTERNAL MEDICINE

## 2018-04-02 PROCEDURE — 87070 CULTURE OTHR SPECIMN AEROBIC: CPT | Performed by: SURGERY

## 2018-04-02 PROCEDURE — 87075 CULTR BACTERIA EXCEPT BLOOD: CPT | Performed by: SURGERY

## 2018-04-02 PROCEDURE — 0FJ44ZZ INSPECTION OF GALLBLADDER, PERCUTANEOUS ENDOSCOPIC APPROACH: ICD-10-PCS | Performed by: SURGERY

## 2018-04-02 PROCEDURE — 25010000002 MIDAZOLAM PER 1 MG: Performed by: NURSE ANESTHETIST, CERTIFIED REGISTERED

## 2018-04-02 PROCEDURE — 25010000002 HYDROMORPHONE HCL PF 500 MG/50ML SOLUTION

## 2018-04-02 PROCEDURE — 87186 SC STD MICRODIL/AGAR DIL: CPT | Performed by: SURGERY

## 2018-04-02 PROCEDURE — 25010000003 CEFAZOLIN PER 500 MG: Performed by: NURSE ANESTHETIST, CERTIFIED REGISTERED

## 2018-04-02 PROCEDURE — 25010000002 FENTANYL CITRATE (PF) 100 MCG/2ML SOLUTION: Performed by: NURSE ANESTHETIST, CERTIFIED REGISTERED

## 2018-04-02 PROCEDURE — 25010000002 PROPOFOL 10 MG/ML EMULSION: Performed by: NURSE ANESTHETIST, CERTIFIED REGISTERED

## 2018-04-02 PROCEDURE — 47600 CHOLECYSTECTOMY: CPT | Performed by: SURGERY

## 2018-04-02 PROCEDURE — 85730 THROMBOPLASTIN TIME PARTIAL: CPT | Performed by: SURGERY

## 2018-04-02 PROCEDURE — 85025 COMPLETE CBC W/AUTO DIFF WBC: CPT | Performed by: INTERNAL MEDICINE

## 2018-04-02 PROCEDURE — 25010000002 DEXAMETHASONE PER 1 MG: Performed by: NURSE ANESTHETIST, CERTIFIED REGISTERED

## 2018-04-02 PROCEDURE — 82150 ASSAY OF AMYLASE: CPT | Performed by: SURGERY

## 2018-04-02 PROCEDURE — 87205 SMEAR GRAM STAIN: CPT | Performed by: SURGERY

## 2018-04-02 PROCEDURE — 85610 PROTHROMBIN TIME: CPT | Performed by: SURGERY

## 2018-04-02 PROCEDURE — 87015 SPECIMEN INFECT AGNT CONCNTJ: CPT | Performed by: SURGERY

## 2018-04-02 PROCEDURE — 25010000002 SUCCINYLCHOLINE PER 20 MG: Performed by: NURSE ANESTHETIST, CERTIFIED REGISTERED

## 2018-04-02 PROCEDURE — 25010000002 ONDANSETRON PER 1 MG: Performed by: NURSE ANESTHETIST, CERTIFIED REGISTERED

## 2018-04-02 PROCEDURE — 99232 SBSQ HOSP IP/OBS MODERATE 35: CPT | Performed by: INTERNAL MEDICINE

## 2018-04-02 PROCEDURE — 80053 COMPREHEN METABOLIC PANEL: CPT | Performed by: INTERNAL MEDICINE

## 2018-04-02 PROCEDURE — 25010000002 HYDROMORPHONE PER 4 MG

## 2018-04-02 RX ORDER — ONDANSETRON 2 MG/ML
4 INJECTION INTRAMUSCULAR; INTRAVENOUS ONCE AS NEEDED
Status: COMPLETED | OUTPATIENT
Start: 2018-04-02 | End: 2018-04-02

## 2018-04-02 RX ORDER — ONDANSETRON 2 MG/ML
4 INJECTION INTRAMUSCULAR; INTRAVENOUS ONCE AS NEEDED
Status: DISCONTINUED | OUTPATIENT
Start: 2018-04-02 | End: 2018-04-02

## 2018-04-02 RX ORDER — ONDANSETRON 4 MG/1
4 TABLET, ORALLY DISINTEGRATING ORAL EVERY 6 HOURS PRN
Status: DISCONTINUED | OUTPATIENT
Start: 2018-04-02 | End: 2018-04-06 | Stop reason: HOSPADM

## 2018-04-02 RX ORDER — MAGNESIUM HYDROXIDE 1200 MG/15ML
LIQUID ORAL AS NEEDED
Status: DISCONTINUED | OUTPATIENT
Start: 2018-04-02 | End: 2018-04-02 | Stop reason: HOSPADM

## 2018-04-02 RX ORDER — PROMETHAZINE HYDROCHLORIDE 25 MG/ML
12.5 INJECTION, SOLUTION INTRAMUSCULAR; INTRAVENOUS EVERY 6 HOURS PRN
Status: DISCONTINUED | OUTPATIENT
Start: 2018-04-02 | End: 2018-04-06 | Stop reason: HOSPADM

## 2018-04-02 RX ORDER — MIDAZOLAM HYDROCHLORIDE 1 MG/ML
1 INJECTION INTRAMUSCULAR; INTRAVENOUS
Status: DISCONTINUED | OUTPATIENT
Start: 2018-04-02 | End: 2018-04-02 | Stop reason: HOSPADM

## 2018-04-02 RX ORDER — LIDOCAINE HYDROCHLORIDE AND EPINEPHRINE 15; 5 MG/ML; UG/ML
INJECTION, SOLUTION EPIDURAL AS NEEDED
Status: DISCONTINUED | OUTPATIENT
Start: 2018-04-02 | End: 2018-04-02 | Stop reason: HOSPADM

## 2018-04-02 RX ORDER — LIDOCAINE HYDROCHLORIDE 20 MG/ML
INJECTION, SOLUTION INFILTRATION; PERINEURAL AS NEEDED
Status: DISCONTINUED | OUTPATIENT
Start: 2018-04-02 | End: 2018-04-02 | Stop reason: SURG

## 2018-04-02 RX ORDER — ONDANSETRON 2 MG/ML
4 INJECTION INTRAMUSCULAR; INTRAVENOUS EVERY 6 HOURS PRN
Status: DISCONTINUED | OUTPATIENT
Start: 2018-04-02 | End: 2018-04-06 | Stop reason: HOSPADM

## 2018-04-02 RX ORDER — SUCCINYLCHOLINE CHLORIDE 20 MG/ML
INJECTION INTRAMUSCULAR; INTRAVENOUS AS NEEDED
Status: DISCONTINUED | OUTPATIENT
Start: 2018-04-02 | End: 2018-04-02 | Stop reason: SURG

## 2018-04-02 RX ORDER — ONDANSETRON 4 MG/1
4 TABLET, FILM COATED ORAL EVERY 6 HOURS PRN
Status: DISCONTINUED | OUTPATIENT
Start: 2018-04-02 | End: 2018-04-06 | Stop reason: HOSPADM

## 2018-04-02 RX ORDER — FAMOTIDINE 20 MG/50ML
20 INJECTION, SOLUTION INTRAVENOUS
Status: DISCONTINUED | OUTPATIENT
Start: 2018-04-02 | End: 2018-04-02 | Stop reason: HOSPADM

## 2018-04-02 RX ORDER — SODIUM CHLORIDE, SODIUM LACTATE, POTASSIUM CHLORIDE, CALCIUM CHLORIDE 600; 310; 30; 20 MG/100ML; MG/100ML; MG/100ML; MG/100ML
INJECTION, SOLUTION INTRAVENOUS CONTINUOUS PRN
Status: DISCONTINUED | OUTPATIENT
Start: 2018-04-02 | End: 2018-04-02 | Stop reason: SURG

## 2018-04-02 RX ORDER — ROCURONIUM BROMIDE 10 MG/ML
INJECTION, SOLUTION INTRAVENOUS AS NEEDED
Status: DISCONTINUED | OUTPATIENT
Start: 2018-04-02 | End: 2018-04-02 | Stop reason: SURG

## 2018-04-02 RX ORDER — FENTANYL CITRATE 50 UG/ML
INJECTION, SOLUTION INTRAMUSCULAR; INTRAVENOUS AS NEEDED
Status: DISCONTINUED | OUTPATIENT
Start: 2018-04-02 | End: 2018-04-02 | Stop reason: SURG

## 2018-04-02 RX ORDER — HYDROMORPHONE HCL 110MG/55ML
1 PATIENT CONTROLLED ANALGESIA SYRINGE INTRAVENOUS
Status: DISCONTINUED | OUTPATIENT
Start: 2018-04-02 | End: 2018-04-03

## 2018-04-02 RX ORDER — SODIUM CHLORIDE, SODIUM LACTATE, POTASSIUM CHLORIDE, CALCIUM CHLORIDE 600; 310; 30; 20 MG/100ML; MG/100ML; MG/100ML; MG/100ML
100 INJECTION, SOLUTION INTRAVENOUS CONTINUOUS
Status: DISCONTINUED | OUTPATIENT
Start: 2018-04-02 | End: 2018-04-02

## 2018-04-02 RX ORDER — PROPOFOL 10 MG/ML
VIAL (ML) INTRAVENOUS AS NEEDED
Status: DISCONTINUED | OUTPATIENT
Start: 2018-04-02 | End: 2018-04-02 | Stop reason: SURG

## 2018-04-02 RX ORDER — DEXAMETHASONE SODIUM PHOSPHATE 4 MG/ML
8 INJECTION, SOLUTION INTRA-ARTICULAR; INTRALESIONAL; INTRAMUSCULAR; INTRAVENOUS; SOFT TISSUE ONCE AS NEEDED
Status: COMPLETED | OUTPATIENT
Start: 2018-04-02 | End: 2018-04-02

## 2018-04-02 RX ORDER — ACETAMINOPHEN 500 MG
500 TABLET ORAL EVERY 6 HOURS PRN
Status: DISCONTINUED | OUTPATIENT
Start: 2018-04-02 | End: 2018-04-03

## 2018-04-02 RX ORDER — MIDAZOLAM HYDROCHLORIDE 1 MG/ML
2 INJECTION INTRAMUSCULAR; INTRAVENOUS
Status: DISCONTINUED | OUTPATIENT
Start: 2018-04-02 | End: 2018-04-02 | Stop reason: HOSPADM

## 2018-04-02 RX ADMIN — MIDAZOLAM HYDROCHLORIDE: 1 INJECTION, SOLUTION INTRAMUSCULAR; INTRAVENOUS at 12:31

## 2018-04-02 RX ADMIN — FENTANYL CITRATE 50 MCG: 50 INJECTION, SOLUTION INTRAMUSCULAR; INTRAVENOUS at 13:28

## 2018-04-02 RX ADMIN — SODIUM CHLORIDE 100 ML/HR: 9 INJECTION, SOLUTION INTRAVENOUS at 16:44

## 2018-04-02 RX ADMIN — FENTANYL CITRATE 50 MCG: 50 INJECTION, SOLUTION INTRAMUSCULAR; INTRAVENOUS at 12:46

## 2018-04-02 RX ADMIN — ROCURONIUM BROMIDE 5 MG: 10 INJECTION INTRAVENOUS at 12:46

## 2018-04-02 RX ADMIN — PIPERACILLIN SODIUM,TAZOBACTAM SODIUM 4.5 G: 4; .5 INJECTION, POWDER, FOR SOLUTION INTRAVENOUS at 00:46

## 2018-04-02 RX ADMIN — SUGAMMADEX 200 MG: 100 INJECTION, SOLUTION INTRAVENOUS at 14:30

## 2018-04-02 RX ADMIN — ONDANSETRON 4 MG: 2 INJECTION INTRAMUSCULAR; INTRAVENOUS at 12:10

## 2018-04-02 RX ADMIN — BUDESONIDE AND FORMOTEROL FUMARATE DIHYDRATE 2 PUFF: 160; 4.5 AEROSOL RESPIRATORY (INHALATION) at 08:10

## 2018-04-02 RX ADMIN — PROPOFOL 150 MG: 10 INJECTION, EMULSION INTRAVENOUS at 12:46

## 2018-04-02 RX ADMIN — CEFAZOLIN SODIUM 2 G: 2 SOLUTION INTRAVENOUS at 13:25

## 2018-04-02 RX ADMIN — SUCCINYLCHOLINE CHLORIDE 120 MG: 20 INJECTION, SOLUTION INTRAMUSCULAR; INTRAVENOUS at 12:46

## 2018-04-02 RX ADMIN — SODIUM CHLORIDE, POTASSIUM CHLORIDE, SODIUM LACTATE AND CALCIUM CHLORIDE: 600; 310; 30; 20 INJECTION, SOLUTION INTRAVENOUS at 14:39

## 2018-04-02 RX ADMIN — HYDROMORPHONE HYDROCHLORIDE 1 MG: 10 INJECTION INTRAMUSCULAR; INTRAVENOUS; SUBCUTANEOUS at 15:08

## 2018-04-02 RX ADMIN — HYDROMORPHONE HYDROCHLORIDE 1 MG: 10 INJECTION INTRAMUSCULAR; INTRAVENOUS; SUBCUTANEOUS at 17:54

## 2018-04-02 RX ADMIN — LIDOCAINE HYDROCHLORIDE 100 MG: 20 INJECTION, SOLUTION INFILTRATION; PERINEURAL at 12:46

## 2018-04-02 RX ADMIN — ROCURONIUM BROMIDE 20 MG: 10 INJECTION INTRAVENOUS at 13:42

## 2018-04-02 RX ADMIN — DEXAMETHASONE SODIUM PHOSPHATE 8 MG: 4 INJECTION, SOLUTION INTRAMUSCULAR; INTRAVENOUS at 12:11

## 2018-04-02 RX ADMIN — ROCURONIUM BROMIDE 25 MG: 10 INJECTION INTRAVENOUS at 12:58

## 2018-04-02 RX ADMIN — NICOTINE 1 PATCH: 21 PATCH TRANSDERMAL at 22:30

## 2018-04-02 RX ADMIN — FENTANYL CITRATE 100 MCG: 50 INJECTION, SOLUTION INTRAMUSCULAR; INTRAVENOUS at 13:46

## 2018-04-02 RX ADMIN — PIPERACILLIN SODIUM,TAZOBACTAM SODIUM 4.5 G: 4; .5 INJECTION, POWDER, FOR SOLUTION INTRAVENOUS at 19:09

## 2018-04-02 RX ADMIN — SODIUM CHLORIDE, POTASSIUM CHLORIDE, SODIUM LACTATE AND CALCIUM CHLORIDE: 600; 310; 30; 20 INJECTION, SOLUTION INTRAVENOUS at 12:36

## 2018-04-02 RX ADMIN — PIPERACILLIN SODIUM,TAZOBACTAM SODIUM 4.5 G: 4; .5 INJECTION, POWDER, FOR SOLUTION INTRAVENOUS at 10:20

## 2018-04-02 RX ADMIN — FAMOTIDINE 20 MG: 20 INJECTION, SOLUTION INTRAVENOUS at 12:11

## 2018-04-02 RX ADMIN — HYDROMORPHONE HYDROCHLORIDE 1 MG: 10 INJECTION INTRAMUSCULAR; INTRAVENOUS; SUBCUTANEOUS at 20:02

## 2018-04-02 RX ADMIN — HYDROMORPHONE HYDROCHLORIDE 1 MG: 10 INJECTION INTRAMUSCULAR; INTRAVENOUS; SUBCUTANEOUS at 15:19

## 2018-04-02 RX ADMIN — BUDESONIDE AND FORMOTEROL FUMARATE DIHYDRATE 2 PUFF: 160; 4.5 AEROSOL RESPIRATORY (INHALATION) at 20:32

## 2018-04-02 RX ADMIN — HYDROMORPHONE HYDROCHLORIDE 1 MG: 1 INJECTION, SOLUTION INTRAMUSCULAR; INTRAVENOUS; SUBCUTANEOUS at 17:54

## 2018-04-02 RX ADMIN — FENTANYL CITRATE 50 MCG: 50 INJECTION, SOLUTION INTRAMUSCULAR; INTRAVENOUS at 14:44

## 2018-04-02 RX ADMIN — HYDROCODONE BITARTRATE AND ACETAMINOPHEN 1 TABLET: 5; 325 TABLET ORAL at 16:21

## 2018-04-02 RX ADMIN — HYDROMORPHONE HYDROCHLORIDE 1 MG: 10 INJECTION INTRAMUSCULAR; INTRAVENOUS; SUBCUTANEOUS at 15:30

## 2018-04-02 RX ADMIN — HYDROCODONE BITARTRATE AND ACETAMINOPHEN 1 TABLET: 5; 325 TABLET ORAL at 21:52

## 2018-04-02 NOTE — ANESTHESIA PREPROCEDURE EVALUATION
Anesthesia Evaluation     Patient summary reviewed and Nursing notes reviewed   no history of anesthetic complications:  NPO Solid Status: > 8 hours  NPO Liquid Status: > 8 hours           Airway   Mallampati: II  TM distance: >3 FB  Neck ROM: full  Dental    (+) implants    Pulmonary     breath sounds clear to auscultation  (+) a smoker ( 2ppd x 50 years. uses inhaler 2x/day. last was this morning) Current Abstained day of surgery, COPD moderate, sleep apnea ( no cpap),   Cardiovascular   Exercise tolerance: poor (<4 METS)    ECG reviewed  Rhythm: regular  Rate: normal    (+) hypertension well controlled, hyperlipidemia,       Neuro/Psych- negative ROS  GI/Hepatic/Renal/Endo    (+)  GERD well controlled,  liver disease (hemachromocytosis),     Musculoskeletal (-) negative ROS    Abdominal    Substance History   (+) alcohol use ( quit drinking 6 weeks ago.  Was drinking whiskey...4 liters, 3x/week),      OB/GYN negative ob/gyn ROS         Other        ROS/Med Hx Other: Last asa:  5 days       Phys Exam Other: Upper and lower implants              Anesthesia Plan    ASA 3     general     intravenous induction   Anesthetic plan and risks discussed with patient.  Use of blood products discussed with patient  Consented to blood products.

## 2018-04-02 NOTE — ANESTHESIA POSTPROCEDURE EVALUATION
Patient: Babak Herrera    Procedure Summary     Date:  04/02/18 Room / Location:   [facility] /  [facility]    Anesthesia Start:  1236 Anesthesia Stop:  1456    Procedures:       CHOLECYSTECTOMY LAPAROSCOPIC converted to open cholecystectomy (N/A Abdomen)      CHOLECYSTECTOMY (N/A Abdomen) Diagnosis:       Acute cholecystitis      Elevated LFTs      Right upper quadrant abdominal pain      Cirrhosis of liver      (Acute cholecystitis [K81.0])      (Elevated LFTs [R79.89])      (Right upper quadrant abdominal pain [R10.11])    Surgeon:  Marley Tamez MD Provider:  Nayely Keita CRNA    Anesthesia Type:  general ASA Status:  3          Anesthesia Type: general  Last vitals  BP   111/56 (04/02/18 1555)   Temp   97.2 °F (36.2 °C) (04/02/18 1555)   Pulse   76 (04/02/18 1555)   Resp   15 (04/02/18 1555)     SpO2   97 % (04/02/18 1555)     Post Anesthesia Care and Evaluation    Patient location during evaluation: bedside  Patient participation: complete - patient participated  Level of consciousness: awake  Pain management: adequate  Airway patency: patent  Anesthetic complications: No anesthetic complications  PONV Status: none  Cardiovascular status: acceptable  Respiratory status: acceptable  Hydration status: acceptable

## 2018-04-02 NOTE — ANESTHESIA PROCEDURE NOTES
Airway  Airway not difficult    General Information and Staff    Patient location during procedure: OR  CRNA: SCAR OVALLE    Indications and Patient Condition  Indications for airway management: airway protection    Preoxygenated: yes  MILS maintained throughout  Mask difficulty assessment: 1 - vent by mask    Final Airway Details  Final airway type: endotracheal airway      Successful airway: ETT  Cuffed: yes   Successful intubation technique: direct laryngoscopy  Endotracheal tube insertion site: oral  Blade: Gavino  Blade size: #4  ETT size: 7.5 mm  Cormack-Lehane Classification: grade I - full view of glottis  Placement verified by: chest auscultation and capnometry   Number of attempts at approach: 1

## 2018-04-02 NOTE — OP NOTE
Date of procedure: 4/2/18    Preoperative diagnosis: Acute cholecystitis  Postoperative diagnosis: Acute cholecystitis                    Micronodular cirrhosis of liver    Procedure: Laparoscopic converted to open cholecystectomy    Surgeon: Marley Tamez M.D.  Assistant: Vin Bullard M.D.  Anesthesia: Gen. with endotracheal intubation  Anesthesia provider: Savannah Granados CRNA  IVF: Per anesthesia record  EBL: 100 mL  Specimens: Aerobic and anaerobic cultures to microbiology and gallbladder to pathology  Drains: 19 Lithuanian Harjinder drain in Morison's pouch and by cystic duct stump  Condition: Stable  Complications: None    Intraoperative findings: Micronodular cirrhosis.  Acutely inflamed gallbladder with pus.  Significant inflammation and junction of the neck of gallbladder and duodenum making it extremely difficult to safely delineate anatomy at which point it was elected to convert to open procedure.    Indications for procedure: Patient is a 62-year-old male with a significant history of alcoholism who has been dealing with elevated LFTs for greater than a month.  He presented to [facility] emergency room with right upper quadrant and epigastric abdominal pain, leukocytosis and elevated LFTs.  He was admitted and further workup was done and general surgery was asked to see the patient HIDA scan showed nonvisualization of gallbladder at 2 hours.  We discussed surgical versus nonsurgical options-pros and cons risks and benefits.  Patient wanted to proceed with surgery.  We discussed laparoscopic cholecystectomy possible open.  I discussed with him the procedure, risks, benefits, complications including but not limited to risk of bleeding, hematoma or seroma formation, risk of intra-abdominal abscess, wound infection, bile leak/biloma, retained common bile duct stone, injury to common bile duct and or other organs requiring additional procedures as well as complications associated with anesthetic.  Patient  understood and gave informed consent.    Patient was identified by me in the preoperative area, brought to the operating suite and laid supine in the operating room table.  SCDs were applied bilateral lower extremities.  After induction of general anesthesia endotracheal intubation was performed.  Abdomen was prepped and draped in usual sterile surgical fashion.  Please note timeout was performed and the patient did receive preoperative antibiotics.  Infraumbilical incision was made.  Incision was deepened through dermis into subcutaneous tissue.  Fascia was then controlled with 0 Ethibond sutures ×2 , fascia was then incised in the 10 mm balloontipped trocar was placed and pneumoperitoneum was obtained. Patient was placed in reverse Trendelenburg with a left tilt to the operating room table. Under direct visualization the 2 right-sided 5 mm ports and epigastric 10 mm port were placed after each site was infiltrated with local anesthetic. General survey showed diffuse micronodular cirrhosis, omental adhesions to the gallbladder. At this point laparoscopic adhesiolysis was performed omental adhesions with serially taken down off the gallbladder, this was done with a combination of alternating sharp and blunt dissection, vascularized pedicles were controlled with hemoclips.  The stomach and duodenum were then completely tethered to the neck and cystic duct junction using blunt dissection the stomach was gently taken off medially and at that point it became apparent that the duodenum was extremely tethered to this area. Once again blunt dissection utilized to take down duodenum and at this point a arterial branch coming off medially 8 appeared to be running into the gallbladder but due to the duodenal adhesions and the anatomy being so distorted I was unable to safely delineate the anatomy and at this point it was elected to convert to open. All trocar sites were evaluated and removed under direct visualization, there  was no evidence of bleeding. Infraumbilical fascia was closed with 0 Ethibond in interrupted fashion.    Using a 10 surgical blade a right upper quadrant subcostal incision was made incorporating the subxiphoid laparoscopic trocar site and one of the 5 mm trocar sites incision was deepened through dermis into subcutaneous tissue.  Anterior rectus sheath was incised it is muscle fibers were incised and then the posterior sheath was entered and incised and the incision was then extended both superiorly and inferiorly.  Bookwalter retractor was placed.  The colon was retracted inferiorly medially while the stomach and duodenal attachments were gently taken down enough to be able to retract.  Gallbladder was grasped at the fundus and dome down technique utilized to take down the the gallbladder off the liver bed.  Peritoneum was extremely thick, inflamed and very friable and oozing the liver bed was also very friable and oozing this was mostly controlled with electrocautery and Surgicel.  Gallbladder was gently taken down both medially and laterally and initially all lateral dissection was done up to what appeared to be the junction of the neck and cystic duct.  Medially peritoneal attachments were taken down the duodenum was then gently peeled off and adhesions were taken down using right angle and Metzenbaums.  At this point it became apparent that the cystic artery was running medially into the gallbladder. It was cleared of all peritoneal attachments and then ligated with 0 silk ties.  The gallbladder was then entered and immediately purulent drainage was obtained which was suctioned out there was thick sludge but no big stone noted. Due to the significant inflammation I was unable to  safely delineate the junction of cystic duct and neck of gallbladder at which point I elected to enter the gallbladder, the posterior wall of the gallbladder was incised  and I was able to to place my finger into the junction of the  neck of gallbladder with cystic duct.  At this point all peritoneal attachments around this were cleared up some of the vascularized peritoneal attachments were controlled with hemoclips and the junction of cystic duct and neck of gallbladder was completely dissected out, right angle was placed at this junction and the gallbladder was then amputated and the stump was then ligated with 0 silk ties ×2.  No bile leakage was noted.  Specimen was examined at the back table and it had been amputated right at the junction of cystic duct and neck of gallbladder.  Specimen was then sent off for pathology.  Wound was irrigated with copious amounts of warm sterile saline solution, hemostasis was assured. FloSeal was sprayed over the liver bed and a piece of Surgicel was also placed. 19 Vincentian round Harjinder drain was then introduced through the right lateralmost 5 mm laparoscopic port site and placed in Morison's pouch and along the cystic duct stump, it was sutured to the skin with 2-0 silk.  We once again checked for hemostasis which was excellent.  At this point we proceeded with closure.  Posterior fascia and peritoneum were closed en jocelyn with #1 Prolene ×2.  Anterior fascia was closed with #1 Prolene ×2.  Overlying wound was irrigated and skin edges were approximated with skin staples.  The infraumbilical laparoscopic port site skin incision was also closed with sterile skin staples.  Wounds were cleansed and dried, sterile dressings were applied.  Patient was extubated, awakened and taken to recovery room in stable condition.  Sponge, instrument, needle counts were all correct at the end of the procedure ×2.  Patient tolerated the procedure well.

## 2018-04-02 NOTE — PLAN OF CARE
Problem: Patient Care Overview  Goal: Plan of Care Review  Outcome: Ongoing (interventions implemented as appropriate)   04/02/18 0232   Coping/Psychosocial   Plan of Care Reviewed With patient       Problem: Pain, Acute (Adult)  Goal: Acceptable Pain Control/Comfort Level  Outcome: Ongoing (interventions implemented as appropriate)   04/02/18 0232   Pain, Acute (Adult)   Acceptable Pain Control/Comfort Level making progress toward outcome

## 2018-04-02 NOTE — NURSING NOTE
"Continued Stay Note  [facility]     Patient Name: Babak Herrera  MRN: [id number]  Today's Date: 4/2/2018    Admit Date: 3/31/2018          Discharge Plan     Row Name 04/02/18 1044       Plan    Plan Comments Spoke with Mr Herrera and he is \"relieved\" to be having surgery today.  \"I have felt bad for months.  I hope this makes me feel better.\"  Will continue to follow              Discharge Codes    No documentation.           Erika Erazo RN    "

## 2018-04-02 NOTE — INTERVAL H&P NOTE
H&P reviewed. The patient was examined and there are no changes to the H&P.       Laparoscopic cholecystectomy-procedure, risks, benefits, complications including but not limited to risk of bleeding, hematoma or seroma formation, intra-abdominal abscess, wound infection, injury to common bile duct and/or other organs requiring additional procedures, bile leak/biloma, likelihood of conversion to open cholecystectomy and cardiopulmonary complications have thoroughly been discussed with the patient.  Patient understands and gives informed consent.

## 2018-04-03 LAB
ALBUMIN SERPL-MCNC: 3.2 G/DL (ref 3.5–5.2)
ALBUMIN/GLOB SERPL: 1 G/DL
ALP SERPL-CCNC: 195 U/L (ref 40–129)
ALT SERPL W P-5'-P-CCNC: 40 U/L (ref 5–41)
ANION GAP SERPL CALCULATED.3IONS-SCNC: 9.7 MMOL/L
AST SERPL-CCNC: 64 U/L (ref 5–40)
BASOPHILS # BLD AUTO: 0.02 10*3/MM3 (ref 0–0.2)
BASOPHILS NFR BLD AUTO: 0.1 % (ref 0–2)
BILIRUB SERPL-MCNC: 1.5 MG/DL (ref 0.2–1.2)
BUN BLD-MCNC: 9 MG/DL (ref 8–23)
BUN/CREAT SERPL: 11.7 (ref 7–25)
CALCIUM SPEC-SCNC: 8.7 MG/DL (ref 8.8–10.5)
CHLORIDE SERPL-SCNC: 102 MMOL/L (ref 98–107)
CO2 SERPL-SCNC: 25.3 MMOL/L (ref 22–29)
CREAT BLD-MCNC: 0.77 MG/DL (ref 0.76–1.27)
DEPRECATED RDW RBC AUTO: 53.3 FL (ref 37–54)
EOSINOPHIL # BLD AUTO: 0.01 10*3/MM3 (ref 0.1–0.3)
EOSINOPHIL NFR BLD AUTO: 0.1 % (ref 0–4)
ERYTHROCYTE [DISTWIDTH] IN BLOOD BY AUTOMATED COUNT: 13.3 % (ref 11.5–14.5)
GFR SERPL CREATININE-BSD FRML MDRD: 102 ML/MIN/1.73
GLOBULIN UR ELPH-MCNC: 3.1 GM/DL
GLUCOSE BLD-MCNC: 109 MG/DL (ref 65–99)
HCT VFR BLD AUTO: 35.6 % (ref 42–52)
HGB BLD-MCNC: 11.9 G/DL (ref 14–18)
IMM GRANULOCYTES # BLD: 0.08 10*3/MM3 (ref 0–0.03)
IMM GRANULOCYTES NFR BLD: 0.6 % (ref 0–0.5)
LYMPHOCYTES # BLD AUTO: 1.23 10*3/MM3 (ref 0.6–4.8)
LYMPHOCYTES NFR BLD AUTO: 9.2 % (ref 20–45)
MCH RBC QN AUTO: 36 PG (ref 27–31)
MCHC RBC AUTO-ENTMCNC: 33.4 G/DL (ref 31–37)
MCV RBC AUTO: 107.6 FL (ref 80–94)
MONOCYTES # BLD AUTO: 1.22 10*3/MM3 (ref 0–1)
MONOCYTES NFR BLD AUTO: 9.1 % (ref 3–8)
NEUTROPHILS # BLD AUTO: 10.8 10*3/MM3 (ref 1.5–8.3)
NEUTROPHILS NFR BLD AUTO: 80.9 % (ref 45–70)
NRBC BLD MANUAL-RTO: 0 /100 WBC (ref 0–0)
PLATELET # BLD AUTO: 208 10*3/MM3 (ref 140–500)
PMV BLD AUTO: 10.9 FL (ref 7.4–10.4)
POTASSIUM BLD-SCNC: 4.1 MMOL/L (ref 3.5–5.2)
PROT SERPL-MCNC: 6.3 G/DL (ref 6–8.5)
RBC # BLD AUTO: 3.31 10*6/MM3 (ref 4.7–6.1)
SODIUM BLD-SCNC: 137 MMOL/L (ref 136–145)
WBC NRBC COR # BLD: 13.36 10*3/MM3 (ref 4.8–10.8)

## 2018-04-03 PROCEDURE — 25010000002 ENOXAPARIN PER 10 MG: Performed by: SURGERY

## 2018-04-03 PROCEDURE — 99232 SBSQ HOSP IP/OBS MODERATE 35: CPT | Performed by: NURSE PRACTITIONER

## 2018-04-03 PROCEDURE — 80053 COMPREHEN METABOLIC PANEL: CPT | Performed by: SURGERY

## 2018-04-03 PROCEDURE — 25010000002 HYDROMORPHONE HCL PF 500 MG/50ML SOLUTION: Performed by: INTERNAL MEDICINE

## 2018-04-03 PROCEDURE — 85025 COMPLETE CBC W/AUTO DIFF WBC: CPT | Performed by: SURGERY

## 2018-04-03 PROCEDURE — 25010000002 PROMETHAZINE PER 50 MG: Performed by: SURGERY

## 2018-04-03 PROCEDURE — 94799 UNLISTED PULMONARY SVC/PX: CPT

## 2018-04-03 PROCEDURE — 25010000002 HYDROMORPHONE HCL PF 500 MG/50ML SOLUTION

## 2018-04-03 PROCEDURE — 25010000002 PIPERACILLIN SOD-TAZOBACTAM PER 1 G: Performed by: INTERNAL MEDICINE

## 2018-04-03 RX ORDER — PANTOPRAZOLE SODIUM 40 MG/1
40 TABLET, DELAYED RELEASE ORAL
Status: DISCONTINUED | OUTPATIENT
Start: 2018-04-04 | End: 2018-04-06 | Stop reason: HOSPADM

## 2018-04-03 RX ORDER — HYDROCODONE BITARTRATE AND ACETAMINOPHEN 7.5; 325 MG/1; MG/1
1 TABLET ORAL EVERY 4 HOURS PRN
Status: DISCONTINUED | OUTPATIENT
Start: 2018-04-03 | End: 2018-04-06 | Stop reason: HOSPADM

## 2018-04-03 RX ORDER — BISACODYL 10 MG
10 SUPPOSITORY, RECTAL RECTAL DAILY PRN
Status: DISCONTINUED | OUTPATIENT
Start: 2018-04-03 | End: 2018-04-06 | Stop reason: HOSPADM

## 2018-04-03 RX ORDER — PANTOPRAZOLE SODIUM 40 MG/1
40 TABLET, DELAYED RELEASE ORAL
Status: DISCONTINUED | OUTPATIENT
Start: 2018-04-04 | End: 2018-04-03 | Stop reason: SDUPTHER

## 2018-04-03 RX ORDER — PANTOPRAZOLE SODIUM 40 MG/1
40 TABLET, DELAYED RELEASE ORAL ONCE
Status: COMPLETED | OUTPATIENT
Start: 2018-04-03 | End: 2018-04-03

## 2018-04-03 RX ORDER — HYDROCODONE BITARTRATE AND ACETAMINOPHEN 7.5; 325 MG/1; MG/1
1 TABLET ORAL EVERY 6 HOURS PRN
Status: DISCONTINUED | OUTPATIENT
Start: 2018-04-03 | End: 2018-04-03

## 2018-04-03 RX ORDER — SACCHAROMYCES BOULARDII 250 MG
250 CAPSULE ORAL 2 TIMES DAILY
Status: DISCONTINUED | OUTPATIENT
Start: 2018-04-03 | End: 2018-04-06 | Stop reason: HOSPADM

## 2018-04-03 RX ADMIN — AMLODIPINE BESYLATE 5 MG: 5 TABLET ORAL at 09:43

## 2018-04-03 RX ADMIN — HYDROMORPHONE HYDROCHLORIDE 1 MG: 10 INJECTION INTRAMUSCULAR; INTRAVENOUS; SUBCUTANEOUS at 00:29

## 2018-04-03 RX ADMIN — ENOXAPARIN SODIUM 40 MG: 40 INJECTION SUBCUTANEOUS at 17:51

## 2018-04-03 RX ADMIN — Medication 0.5 MG: at 01:29

## 2018-04-03 RX ADMIN — PIPERACILLIN SODIUM,TAZOBACTAM SODIUM 4.5 G: 4; .5 INJECTION, POWDER, FOR SOLUTION INTRAVENOUS at 00:46

## 2018-04-03 RX ADMIN — POLYETHYLENE GLYCOL 3350 17 G: 17 POWDER, FOR SOLUTION ORAL at 14:25

## 2018-04-03 RX ADMIN — PANTOPRAZOLE SODIUM 40 MG: 40 TABLET, DELAYED RELEASE ORAL at 18:55

## 2018-04-03 RX ADMIN — BUDESONIDE AND FORMOTEROL FUMARATE DIHYDRATE 2 PUFF: 160; 4.5 AEROSOL RESPIRATORY (INHALATION) at 17:33

## 2018-04-03 RX ADMIN — NICOTINE 1 PATCH: 21 PATCH TRANSDERMAL at 21:44

## 2018-04-03 RX ADMIN — PIPERACILLIN SODIUM,TAZOBACTAM SODIUM 4.5 G: 4; .5 INJECTION, POWDER, FOR SOLUTION INTRAVENOUS at 17:49

## 2018-04-03 RX ADMIN — HYDROCODONE BITARTRATE AND ACETAMINOPHEN 1 TABLET: 7.5; 325 TABLET ORAL at 15:37

## 2018-04-03 RX ADMIN — HYDROMORPHONE HYDROCHLORIDE 1 MG: 10 INJECTION INTRAMUSCULAR; INTRAVENOUS; SUBCUTANEOUS at 09:40

## 2018-04-03 RX ADMIN — HYDROCODONE BITARTRATE AND ACETAMINOPHEN 1 TABLET: 5; 325 TABLET ORAL at 03:06

## 2018-04-03 RX ADMIN — PROMETHAZINE HYDROCHLORIDE 12.5 MG: 25 INJECTION INTRAMUSCULAR; INTRAVENOUS at 03:07

## 2018-04-03 RX ADMIN — PIPERACILLIN SODIUM,TAZOBACTAM SODIUM 4.5 G: 4; .5 INJECTION, POWDER, FOR SOLUTION INTRAVENOUS at 10:11

## 2018-04-03 RX ADMIN — SODIUM CHLORIDE 100 ML/HR: 9 INJECTION, SOLUTION INTRAVENOUS at 14:29

## 2018-04-03 RX ADMIN — HYDROMORPHONE HYDROCHLORIDE 1 MG: 10 INJECTION INTRAMUSCULAR; INTRAVENOUS; SUBCUTANEOUS at 17:48

## 2018-04-03 RX ADMIN — HYDROMORPHONE HYDROCHLORIDE 0.5 MG: 10 INJECTION INTRAMUSCULAR; INTRAVENOUS; SUBCUTANEOUS at 01:29

## 2018-04-03 RX ADMIN — HYDROMORPHONE HYDROCHLORIDE 1 MG: 10 INJECTION INTRAMUSCULAR; INTRAVENOUS; SUBCUTANEOUS at 14:22

## 2018-04-03 RX ADMIN — HYDROMORPHONE HYDROCHLORIDE 1 MG: 10 INJECTION INTRAMUSCULAR; INTRAVENOUS; SUBCUTANEOUS at 21:52

## 2018-04-03 RX ADMIN — Medication 250 MG: at 21:43

## 2018-04-03 RX ADMIN — HYDROCODONE BITARTRATE AND ACETAMINOPHEN 1 TABLET: 5; 325 TABLET ORAL at 11:40

## 2018-04-03 RX ADMIN — BUDESONIDE AND FORMOTEROL FUMARATE DIHYDRATE 2 PUFF: 160; 4.5 AEROSOL RESPIRATORY (INHALATION) at 08:25

## 2018-04-03 RX ADMIN — HYDROMORPHONE HYDROCHLORIDE 1 MG: 10 INJECTION INTRAMUSCULAR; INTRAVENOUS; SUBCUTANEOUS at 07:02

## 2018-04-03 NOTE — NURSING NOTE
Continued Stay Note  [facility]     Patient Name: Babak Herrera  MRN: [id number]  Today's Date: 4/3/2018    Admit Date: 3/31/2018          Discharge Plan     Row Name 04/03/18 1436       Plan    Plan home with spouse    Patient/Family in Agreement with Plan yes    Plan Comments Spoke with patient, he is sitting up in recliner - his brother is visiting. CM # placed on white board, will follow for dc needs.              Discharge Codes    No documentation.           Luciano Cardona RN

## 2018-04-03 NOTE — PLAN OF CARE
Problem: Patient Care Overview  Goal: Plan of Care Review  Outcome: Ongoing (interventions implemented as appropriate)   04/03/18 1575   Coping/Psychosocial   Plan of Care Reviewed With patient   Plan of Care Review   Progress improving   OTHER   Outcome Summary good post-op day 1, tolerates clears well, pain control, up ambulating in mcknight x 3 today     Goal: Individualization and Mutuality  Outcome: Ongoing (interventions implemented as appropriate)    Goal: Discharge Needs Assessment  Outcome: Ongoing (interventions implemented as appropriate)      Problem: Pain, Acute (Adult)  Goal: Identify Related Risk Factors and Signs and Symptoms  Outcome: Outcome(s) achieved Date Met: 04/03/18    Goal: Acceptable Pain Control/Comfort Level  Outcome: Ongoing (interventions implemented as appropriate)

## 2018-04-03 NOTE — PROGRESS NOTES
Adult Nutrition  Assessment/PES    Patient Name:  Babak Herrera  YOB: 1955  MRN: [id number]  Admit Date:  3/31/2018    Assessment Date:  4/3/2018    Comments:  Advance to low fat diet as indicated.   Consider adding Ensure Clears now and change to Ensure Plus once diet advanced. Will cont to follow.           Adult Nutrition Assessment     Row Name 04/03/18 1146       Nutrition Prescription PO    Current PO Diet Clear Liquid       Fluid Intake Evaluation    Oral Fluid (mL) 240   13%       PO Evaluation    Number of Meals 1    % PO Intake 100    Row Name 04/03/18 1145       Calculation Measurements    Weight Used For Calculations 58.1 kg (128 lb 1.4 oz)       Estimated/Assessed Needs    Additional Documentation Calorie Requirements (Group);Fluid Requirements (Group);Protein Requirements (Group)       Calorie Requirements    Estimated Calorie Need Method McIntosh-St Jeor    Estimated Calorie Requirement Comment 1811-9564 kcal ( mifflin x 1.2 + 250 kcal for reported loss)                                                                McIntosh-St. Jeor Equation    RMR (McIntosh-St. Jeor Equation) 1314.75       Protein Requirements    Est Protein Requirement Amount (gms/kg) 1.2 gm protein    Estimated Protein Requirements (gms/day) 69.72       Fluid Requirements    Estimated Fluid Requirements (mL/day) 1827    Estimated Fluid Requirement Method RDA Method    RDA Method (mL) 1827    Liliana-Cristobal Method (over 20 kg) 2662    Row Name 04/03/18 1144       Anthropometrics    Weight 58.1 kg (128 lb 1.4 oz)       Usual Body Weight (UBW)    Weight Loss unintentional    Weight Loss Time Frame 17-20# reported in 2 weeks       Body Mass Index (BMI)    BMI Assessment BMI 18.5-24.9: normal       Labs/Procedures/Meds    Lab Results Reviewed reviewed   meds reviewed    Lab Results Comments AST & Tbili elevated    Row Name 04/03/18 1142       Reason for Assessment    Reason For Assessment per organizational policy    NPO/Clear x 3    Identified At Risk by Screening Criteria MST SCORE 2+       Nutrition/Diet History    Typical Food/Fluid Intake Pt reports used to drink more than eat, (noted stopped ETOH 4 weeks ago). Pt tolerated clears today, looking forward to real food. Reports weight loss 17-20# 2 weeks related to scope etc. NKFA. Denies issue chew/swallow          Problem/Interventions:        Problem 1     Row Name 04/03/18 1147       Nutrition Diagnoses Problem 1    Problem 1 Inadequate Intake/Infusion    Etiology (related to) Medical Diagnosis    Gastrointestinal Cholecystitis    Signs/Symptoms (evidenced by) Clear Liquid Diet;Unintended Weight Change    Unintended Weight Change Loss    Number of Pounds Lost 17-20#    Weight loss time period reported in 2 weeks                    Intervention Goal     Row Name 04/03/18 1147       Intervention Goal    PO Tolerate PO;PO intake (%)    PO Intake % 80 %   pt will consume at least 80% of meals/fluids    Weight No significant weight loss            Nutrition Intervention     Row Name 04/03/18 1148       Nutrition Intervention    RD/Tech Action Interview for preference;Follow Tx progress            Nutrition Prescription     Row Name 04/03/18 1148       Other Orders    Other Advance diet as indicated to low fat diet             Education/Evaluation     Row Name 04/03/18 1148       Education    Education Other (comment)   Explained typical diet advancement from clears to fulls to solid food.        Monitor/Evaluation    Monitor Per protocol;I&O;PO intake;Pertinent labs;Weight;Symptoms   no nutrition d/c needs    Education Follow-up Other (comment)   Pt verbalized understanding, expect compliance.         Electronically signed by:  Jayde Bautista RD  04/03/18 11:49 AM

## 2018-04-03 NOTE — PROGRESS NOTES
"SERVICE: [facility] HOSPITALIST    CONSULTANTS:    CHIEF COMPLAINT: Acute cholecystitis    SUBJECTIVE:    Went to OR today for lap santiago w/ Dr. Tamez, pus filled gallbladder removed, had to be converted to open. Pain is not well controlled w/ 1mg of dialudid. Pain is different but worse than prior to surgery. Still in RUQ, 10/10 pain at worst, better for about 45 minutes with dialudid. Worsening pain with movement and deep breaths.     Pt was agreeable to discuss presence of ETOH nodularity on liver    OBJECTIVE:    /78 (BP Location: Left arm, Patient Position: Lying)   Pulse 81   Temp 97.6 °F (36.4 °C) (Oral)   Resp 15   Ht 167 cm (65.75\")   Wt 58.1 kg (128 lb 1.4 oz)   SpO2 93%   BMI 20.83 kg/m²     MEDS/LABS REVIEWED AND ORDERED      amLODIPine 5 mg Oral Daily   budesonide-formoterol 2 puff Inhalation BID   [START ON 4/3/2018] enoxaparin 40 mg Subcutaneous Daily   nicotine 1 patch Transdermal Q24H   piperacillin-tazobactam 4.5 g Intravenous Q8H       Physical Exam   Constitutional: He is oriented to person, place, and time. He appears well-developed.   Eyes: Pupils are equal, round, and reactive to light.   Cardiovascular: Normal rate and regular rhythm.    No murmur heard.  Pulmonary/Chest: Effort normal and breath sounds normal. No respiratory distress. He has no wheezes.   Abdominal: Soft. Bowel sounds are normal. He exhibits no distension.   Neurological: He is alert and oriented to person, place, and time.   Psychiatric: He has a normal mood and affect. His behavior is normal.   Vitals reviewed.      LAB/DIAGNOSTICS:    Lab Results (last 24 hours)     Procedure Component Value Units Date/Time    Tissue Pathology Exam [[id number]] Collected:  04/02/18 1413    Specimen:  Tissue from Gallbladder Updated:  04/02/18 1516    Anaerobic Culture - Bile, Gallbladder [[id number]] Collected:  04/02/18 1327    Specimen:  Bile from Gallbladder Updated:  04/02/18 1515    Body Fluid Culture - " Bile, Gallbladder [[id number]] Collected:  04/02/18 1331    Specimen:  Bile from Gallbladder Updated:  04/02/18 1515    Comprehensive Metabolic Panel [[id number]]  (Abnormal) Collected:  04/02/18 0345    Specimen:  Blood Updated:  04/02/18 0529     Glucose 87 mg/dL      BUN 6 (L) mg/dL      Creatinine 0.75 (L) mg/dL      Sodium 137 mmol/L      Potassium 3.6 mmol/L      Chloride 100 mmol/L      CO2 23.6 mmol/L      Calcium 8.7 (L) mg/dL      Total Protein 6.5 g/dL      Albumin 3.40 (L) g/dL      ALT (SGPT) 31 U/L      AST (SGOT) 46 (H) U/L      Alkaline Phosphatase 208 (H) U/L      Total Bilirubin 1.7 (H) mg/dL      eGFR Non African Amer 105 mL/min/1.73      Globulin 3.1 gm/dL      A/G Ratio 1.1 g/dL      BUN/Creatinine Ratio 8.0     Anion Gap 13.4 mmol/L     Amylase [[id number]]  (Normal) Collected:  04/02/18 0345    Specimen:  Blood Updated:  04/02/18 0529     Amylase 40 U/L     Lipase [[id number]]  (Normal) Collected:  04/02/18 0345    Specimen:  Blood Updated:  04/02/18 0529     Lipase 19 U/L     aPTT [[id number]]  (Abnormal) Collected:  04/02/18 0345    Specimen:  Blood Updated:  04/02/18 0527     PTT 45.7 (H) seconds     Narrative:       PTT = The equivalent PTT values for the therapeutic range of heparin levels at 0.1 to 0.7 U/ml are 53 to 110 seconds.    Protime-INR [[id number]]  (Abnormal) Collected:  04/02/18 0345    Specimen:  Blood Updated:  04/02/18 0527     Protime 14.7 Seconds      INR 1.14 (H)    Narrative:       Therapeutic Ranges for INR: 2.0-3.0 (PT 20-30)                              2.5-3.5 (PT 25-34)    CBC & Differential [[id number]] Collected:  04/02/18 0345    Specimen:  Blood Updated:  04/02/18 0515    Narrative:       The following orders were created for panel order CBC & Differential.  Procedure                               Abnormality         Status                     ---------                               -----------         ------                     Scan Slide[[id number]]                                                                   CBC Auto Differential[[id number]]        Abnormal            Final result                 Please view results for these tests on the individual orders.    CBC Auto Differential [[id number]]  (Abnormal) Collected:  04/02/18 0345    Specimen:  Blood Updated:  04/02/18 0515     WBC 13.68 (H) 10*3/mm3      RBC 3.52 (L) 10*6/mm3      Hemoglobin 12.6 (L) g/dL      Hematocrit 37.7 (L) %      .1 (H) fL      MCH 35.8 (H) pg      MCHC 33.4 g/dL      RDW 13.5 %      RDW-SD 53.2 fl      MPV 11.6 (H) fL      Platelets 210 10*3/mm3      Neutrophil % 76.5 (H) %      Lymphocyte % 12.2 (L) %      Monocyte % 9.5 (H) %      Eosinophil % 0.9 %      Basophil % 0.5 %      Immature Grans % 0.4 %      Neutrophils, Absolute 10.47 (H) 10*3/mm3      Lymphocytes, Absolute 1.67 10*3/mm3      Monocytes, Absolute 1.30 (H) 10*3/mm3      Eosinophils, Absolute 0.12 10*3/mm3      Basophils, Absolute 0.07 10*3/mm3      Immature Grans, Absolute 0.05 (H) 10*3/mm3      nRBC 0.0 /100 WBC         ECG 12 Lead   Final Result   RR Interval= 625 ms   LA Interval= 128 ms   QRSD Interval= 78 ms   QT Interval= 348 ms   QTc Interval= 440 ms   Heart Rate= 96 ms   P Axis= 43 deg   QRS Axis= -38 deg   T Wave Axis= 43 deg   I: 40 Axis= 52 deg   T: 40 Axis= -45 deg   ST Axis= 72 deg   SINUS RHYTHM   LEFT AXIS DEVIATION   NO SIGNIFICANT CHANGE FROM PREVIOUS ECG   Electronically Signed by:  Samson Nayak ([facility]) 01-Apr-2018 19:59:15   Date and Time of Study: 2018-04-01 18:55:21           Nm Hepatobiliary Without Cck    Result Date: 4/2/2018  Nonvisualization of the gallbladder at 2 hours suspicious for acute cholecystitis. No biliary obstruction.  A preliminary report was provided by Dr. Collier at 3:50 PM on 4/1/2018.  This report was finalized on 4/2/2018 9:58 AM by Dr. Jatin Pollock MD.      Us Abdomen Complete    Result Date: 4/2/2018   1. Distended gallbladder with mildly thickened wall and some pericholecystic  fluid. There is also gallbladder sludge and probably adenomyomatosis of the gallbladder wall. Common duct is prominent as well at 7 mm. 2. The remainder of the abdominal ultrasound is negative.  A preliminary report was provided by Dr. Hinds at 1115 hours on 4/1/2018.  This report was finalized on 4/2/2018 10:07 AM by Dr. Jatin Pollock MD.      Xr Chest Pa & Lateral    Result Date: 4/2/2018  No active disease.  A preliminary report was provided by Dr. Collier at 1540 hours on 4/1/2018.  This report was finalized on 4/2/2018 8:35 AM by Dr. Jatin Pollock MD.          ASSESSMENT/PLAN:    Acutecholecystitis  - S/p open santiago with pus filled gall bladder  - Pain, increasing dialudid to 1.5mg Q2H will monitor for sedation    Likely cirrhosis   - nodular liver seen during surgery  - lft's unremarkable  - has had excellent work up by Dr. Stewart.

## 2018-04-03 NOTE — PROGRESS NOTES
BGA/GI Progress Note   Chief Complaint:  Acute cholecystitis    Subjective     Interval History:     Patient's postoperative day 1 status post laparoscopic with conversion to open cholecystectomy.  He is complaining of incisional pain.  Mild abdominal distention.  Is tolerating liquids without difficulty.  No nausea present.  No flatus.  Has not had a bowel movement.    Objective     Vital Signs  Temp:  [97.2 °F (36.2 °C)-99 °F (37.2 °C)] 98 °F (36.7 °C)  Heart Rate:  [68-88] 85  Resp:  [14-24] 16  BP: (107-147)/(56-95) 129/76  Body mass index is 20.83 kg/m².    Intake/Output Summary (Last 24 hours) at 04/03/18 1349  Last data filed at 04/03/18 1011   Gross per 24 hour   Intake             1200 ml   Output             1800 ml   Net             -600 ml     I/O this shift:  In: 100 [IV Piggyback:100]  Out: 500 [Urine:500]       Physical Exam:   General: patient awake, alert and cooperative   Eyes: Normal lids and lashes, no scleral icterus   Abdomen: soft, tender, mild distended; normal bowel sounds                                 Dressing dry and intact.  Minimal serous drainage out of his                                 Juanpablo-Jane.      Extremities: no rash or edema   Neurologic: Normal mood and behavior     Results Review:       WBC No results found for: WBCS   HGB Hemoglobin   Date Value Ref Range Status   04/03/2018 11.9 (L) 14.0 - 18.0 g/dL Final   04/02/2018 12.6 (L) 14.0 - 18.0 g/dL Final   04/01/2018 13.7 (L) 14.0 - 18.0 g/dL Final   03/31/2018 14.2 14.0 - 18.0 g/dL Final      HCT Hematocrit   Date Value Ref Range Status   04/03/2018 35.6 (L) 42.0 - 52.0 % Final   04/02/2018 37.7 (L) 42.0 - 52.0 % Final   04/01/2018 40.3 (L) 42.0 - 52.0 % Final   03/31/2018 41.5 (L) 42.0 - 52.0 % Final      Platlets No results found for: LABPLAT     PT/INR:    Protime   Date Value Ref Range Status   04/02/2018 14.7 12.1 - 15.0 Seconds Final   04/01/2018 12.8 12.1 - 15.0 Seconds Final   03/31/2018 12.9 12.1 - 15.0  Seconds Final   /  INR   Date Value Ref Range Status   04/02/2018 1.14 (H) 0.90 - 1.10 Final   04/01/2018 0.96 0.90 - 1.10 Final   03/31/2018 0.97 0.90 - 1.10 Final       Sodium Sodium   Date Value Ref Range Status   04/03/2018 137 136 - 145 mmol/L Final   04/02/2018 137 136 - 145 mmol/L Final   04/01/2018 135 (L) 136 - 145 mmol/L Final   03/31/2018 133 (L) 136 - 145 mmol/L Final      Potassium Potassium   Date Value Ref Range Status   04/03/2018 4.1 3.5 - 5.2 mmol/L Final   04/02/2018 3.6 3.5 - 5.2 mmol/L Final   04/01/2018 3.9 3.5 - 5.2 mmol/L Final   03/31/2018 3.5 3.5 - 5.2 mmol/L Final      Chloride Chloride   Date Value Ref Range Status   04/03/2018 102 98 - 107 mmol/L Final   04/02/2018 100 98 - 107 mmol/L Final   04/01/2018 98 98 - 107 mmol/L Final   03/31/2018 95 (L) 98 - 107 mmol/L Final      Bicarbonate No results found for: PLASMABICARB   BUN BUN   Date Value Ref Range Status   04/03/2018 9 8 - 23 mg/dL Final   04/02/2018 6 (L) 8 - 23 mg/dL Final   04/01/2018 7 (L) 8 - 23 mg/dL Final   03/31/2018 8 8 - 23 mg/dL Final      Creatinine Creatinine   Date Value Ref Range Status   04/03/2018 0.77 0.76 - 1.27 mg/dL Final   04/02/2018 0.75 (L) 0.76 - 1.27 mg/dL Final   04/01/2018 0.72 (L) 0.76 - 1.27 mg/dL Final   03/31/2018 0.80 0.76 - 1.27 mg/dL Final      Calcium Calcium   Date Value Ref Range Status   04/03/2018 8.7 (L) 8.8 - 10.5 mg/dL Final   04/02/2018 8.7 (L) 8.8 - 10.5 mg/dL Final   04/01/2018 9.0 8.8 - 10.5 mg/dL Final   03/31/2018 9.3 8.8 - 10.5 mg/dL Final      Magnesium  AST  ALT  Bilirubin, Total  AlkPhos  Albumin    Amylase  Lipase    Radiology: No results found for: MG  No components found for: AST.*  No components found for: ALT.*  No components found for: BILIRUBIN, TOTAL.*    No components found for: ALKPHOS.*  No components found for: ALBUMIN.*      No components found for: AMYLASE.*  No components found for: LIPASE.*            Imaging Results (most recent)     Procedure Component Value  Units Date/Time    US Abdomen Complete [[id number]] Collected:  04/02/18 1000     Updated:  04/02/18 1009    Narrative:       INDICATION: Abdominal pain for 2 days.     EXAM: Abdominal ultrasound, complete.     COMPARISON: CT abdomen 3/31/2018     FINDINGS:  Pancreas is normal. Liver parenchyma is homogeneous with no focal mass.  There is a tiny cyst in the left hepatic lobe. Gallbladder is distended  with a mildly thickened wall and a trace amount of pericholecystic  fluid. Gallbladder sludge is noted. There may be some adenomyomatosis in  the gallbladder wall. Common duct is prominent for size and 7 mm. No  stone is seen within the duct. IVC is patent by Doppler. Abdominal aorta  is normal in caliber. Spleen size is normal with a nfgn-pb-frpg length  of 11.4 cm. Both kidneys are morphologically normal and nonobstructed.       Impression:          1. Distended gallbladder with mildly thickened wall and some  pericholecystic fluid. There is also gallbladder sludge and probably  adenomyomatosis of the gallbladder wall. Common duct is prominent as  well at 7 mm.  2. The remainder of the abdominal ultrasound is negative.     A preliminary report was provided by Dr. Hinds at 1115 hours on 4/1/2018.     This report was finalized on 4/2/2018 10:07 AM by Dr. Jatin Pollock MD.       NM Hepatobiliary Without CCK [[id number]] Collected:  04/02/18 0955     Updated:  04/02/18 1000    Narrative:       Hepatobiliary scan     INDICATION: Right upper quadrant pain with fever and elevated white  blood cell count. Abnormal CT 3/31/2018 showing cystic duct stone.     FINDINGS: Imaging was obtained of the abdomen for 2 hours after the IV  administration of 5.7 mCi of technetium 99m-Choletec. COMPARISON made  with CT abdomen 3/31/2018.     There is prompt uptake by the liver with prompt biliary and small bowel  activity seen within 15 minutes. Gallbladder is not seen on the  examination. This is concerning for cystic duct obstruction and  acute  cholecystitis.       Impression:       Nonvisualization of the gallbladder at 2 hours suspicious  for acute cholecystitis. No biliary obstruction.     A preliminary report was provided by Dr. Collier at 3:50 PM on 4/1/2018.     This report was finalized on 4/2/2018 9:58 AM by Dr. Jatin Pollock MD.       XR Chest PA & Lateral [[id number]] Collected:  04/02/18 0834     Updated:  04/02/18 0837    Narrative:       Chest x-ray     INDICATION: Preoperative exam for cholecystectomy. COPD. Reflux disease.     FINDINGS: PA and lateral views of the chest were obtained. No  comparison. Lungs are clear. Cardiac and mediastinal contours are  normal. No pneumothorax.       Impression:       No active disease.     A preliminary report was provided by Dr. Collier at 1540 hours on  4/1/2018.     This report was finalized on 4/2/2018 8:35 AM by Dr. Jatin Pollock MD.       CT Abdomen Pelvis With Contrast [[id number]] Collected:  04/01/18 0736     Updated:  04/01/18 0741    Narrative:       CT ABDOMEN AND PELVIS WITH INTRAVENOUS CONTRAST     HISTORY:  Diffuse abdominal pain worsening over the past day. Colonoscopy  yesterday.     TECHNIQUE:  Axial images were obtained with intravenous contrast. 100 cc of Isovue  was used. COMPARISON from 2/16/2018. 1 previous CT and no nuclear  cardiac studies over the past year. Radiation dose reduction techniques  were utilized, including automated exposure control and exposure  modulation based on body size.     ABDOMEN FINDINGS:  The lung bases are clear. There is no evidence of free air in the  peritoneal cavity. Fatty infiltration of the liver is noted. Shows  improvement since the scan of 2/16/2018. A small hepatic cyst is  unchanged. The spleen kidneys adrenals and pancreas have a normal  appearance. The gallbladder is distended and there is a small  pericholecystic fluid collection that was not seen on the previous exam.  There is a small partially calcified gallstone in the gallbladder  neck  or cystic duct. The common bile duct is nondilated. FINDINGS worrisome  for early cholecystitis. There is no evidence of retroperitoneal  adenopathy or ascites and no distended bowel loops are seen.     PELVIS FINDINGS:  In the pelvis there is no evidence of adenopathy mass or fluid  collection. Normal appendix.       Impression:       Small cystic duct stone with pericholecystic fluid  suspicious for early cholecystitis. Improvement in hepatic steatosis.  Normal appendix. No evidence of free air in the peritoneal cavity.     This report was finalized on 4/1/2018 7:39 AM by Dr. Jatin Braga MD.                                        sodium chloride 100 mL/hr Last Rate: 100 mL/hr (04/02/18 1644)           Assessment/Plan     Patient Active Problem List   Diagnosis Code   • Encounter for screening for malignant neoplasm of colon Z12.11   • Elevated LFTs R79.89   • Abdominal pain R10.9   • Acute cholecystitis K81.0   • Right upper quadrant abdominal pain R10.11       Patient stable following surgery.  He does have some mild distention.  We'll hold him on clear liquids at this point.  Labs are stable.        Vin Bullard MD  04/03/18  1:49 PM

## 2018-04-03 NOTE — PLAN OF CARE
Problem: Pain, Acute (Adult)  Intervention: Monitor and Manage Analgesia   04/02/18 2330   Promote Effective Bowel Elimination   Bowel Intervention adequate fluid intake promoted   Safety Management   Medication Review/Management medications reviewed       Goal: Acceptable Pain Control/Comfort Level  Outcome: Ongoing (interventions implemented as appropriate)   04/02/18 2330   Pain, Acute (Adult)   Acceptable Pain Control/Comfort Level making progress toward outcome

## 2018-04-03 NOTE — PROGRESS NOTES
Patient: Babak Herrera  Procedure(s):  CHOLECYSTECTOMY LAPAROSCOPIC converted to open cholecystectomy  CHOLECYSTECTOMY  Anesthesia type: [unfilled]    Patient location: [facility] Surgical Floor  Last vitals:   Vitals:    04/03/18 0825   BP:    Pulse: 68   Resp: 16   Temp:    SpO2: 97%     Level of consciousness: awake, alert and oriented    Post-anesthesia pain: adequate analgesia  Airway patency: patent  Respiratory: unassisted  Cardiovascular: stable and blood pressure at baseline  Hydration: euvolemic    Anesthetic complications: no

## 2018-04-03 NOTE — PROGRESS NOTES
"SERVICE: [facility] HOSPITALIST    CONSULTANTS: Surgery    CHIEF COMPLAINT: f/u cholecystitis, s/p open cholecystectomy, POD 1    SUBJECTIVE: The patient reports that his pain is poorly controlled, and that he has required both oral and IV pain medication and he is not getting it often enough and it is not holding him. He reports very occasional flatus, no BM. He is attempting to tolerate clear liquid diet this morning.  He asks \"am I going home today\". He otherwise denies f/c/cough/soa/chest pain/n/v/d or other new concerns.    OBJECTIVE:    /76 (BP Location: Left arm, Patient Position: Sitting)   Pulse 85   Temp 98 °F (36.7 °C) (Oral)   Resp 16   Ht 167 cm (65.75\")   Wt 58.1 kg (128 lb 1.4 oz)   SpO2 99%   BMI 20.83 kg/m²     MEDS/LABS REVIEWED AND ORDERED    amLODIPine 5 mg Oral Daily   budesonide-formoterol 2 puff Inhalation BID   enoxaparin 40 mg Subcutaneous Daily   nicotine 1 patch Transdermal Q24H   [START ON 4/4/2018] pantoprazole 40 mg Oral Q AM   piperacillin-tazobactam 4.5 g Intravenous Q8H   polyethylene glycol 17 g Oral Daily   saccharomyces boulardii 250 mg Oral BID     Physical Exam   Constitutional: He is oriented to person, place, and time. He appears well-developed and well-nourished.   HENT:   Head: Normocephalic and atraumatic.   Eyes: EOM are normal. Pupils are equal, round, and reactive to light.   Cardiovascular: Normal rate and regular rhythm.    Pulmonary/Chest: Effort normal and breath sounds normal.   Abdominal: Soft.   Occasional bowel sounds. ABD pad over abdominal incision clean, dry and intact   Musculoskeletal: He exhibits no edema.   Neurological: He is alert and oriented to person, place, and time.   Skin: Skin is warm and dry.   Psychiatric: He has a normal mood and affect. His behavior is normal.   Vitals reviewed.    LAB/DIAGNOSTICS:    Lab Results (last 24 hours)     Procedure Component Value Units Date/Time    Body Fluid Culture - Bile, " Gallbladder [[id number]]  (Abnormal) Collected:  04/02/18 1331    Specimen:  Bile from Gallbladder Updated:  04/03/18 1207     BF Culture --      Scant growth (1+) Gram Negative Bacilli (A)     Gram Stain Result No organisms seen      Rare (1+) WBCs per low power field    Comprehensive Metabolic Panel [[id number]]  (Abnormal) Collected:  04/03/18 0820    Specimen:  Blood Updated:  04/03/18 0856     Glucose 109 (H) mg/dL      BUN 9 mg/dL      Creatinine 0.77 mg/dL      Sodium 137 mmol/L      Potassium 4.1 mmol/L      Chloride 102 mmol/L      CO2 25.3 mmol/L      Calcium 8.7 (L) mg/dL      Total Protein 6.3 g/dL      Albumin 3.20 (L) g/dL      ALT (SGPT) 40 U/L      AST (SGOT) 64 (H) U/L      Alkaline Phosphatase 195 (H) U/L      Total Bilirubin 1.5 (H) mg/dL      eGFR Non African Amer 102 mL/min/1.73      Globulin 3.1 gm/dL      A/G Ratio 1.0 g/dL      BUN/Creatinine Ratio 11.7     Anion Gap 9.7 mmol/L     CBC & Differential [[id number]] Collected:  04/03/18 0820    Specimen:  Blood Updated:  04/03/18 0836    Narrative:       The following orders were created for panel order CBC & Differential.  Procedure                               Abnormality         Status                     ---------                               -----------         ------                     Scan Slide[[id number]]                                                                  CBC Auto Differential[[id number]]        Abnormal            Final result                 Please view results for these tests on the individual orders.    CBC Auto Differential [[id number]]  (Abnormal) Collected:  04/03/18 0820    Specimen:  Blood Updated:  04/03/18 0836     WBC 13.36 (H) 10*3/mm3      RBC 3.31 (L) 10*6/mm3      Hemoglobin 11.9 (L) g/dL      Hematocrit 35.6 (L) %      .6 (H) fL      MCH 36.0 (H) pg      MCHC 33.4 g/dL      RDW 13.3 %      RDW-SD 53.3 fl      MPV 10.9 (H) fL      Platelets 208 10*3/mm3      Neutrophil % 80.9 (H) %      Lymphocyte % 9.2  (L) %      Monocyte % 9.1 (H) %      Eosinophil % 0.1 %      Basophil % 0.1 %      Immature Grans % 0.6 (H) %      Neutrophils, Absolute 10.80 (H) 10*3/mm3      Lymphocytes, Absolute 1.23 10*3/mm3      Monocytes, Absolute 1.22 (H) 10*3/mm3      Eosinophils, Absolute 0.01 (L) 10*3/mm3      Basophils, Absolute 0.02 10*3/mm3      Immature Grans, Absolute 0.08 (H) 10*3/mm3      nRBC 0.0 /100 WBC     Tissue Pathology Exam [[id number]] Collected:  04/02/18 1413    Specimen:  Tissue from Gallbladder Updated:  04/02/18 1516    Anaerobic Culture - Bile, Gallbladder [[id number]] Collected:  04/02/18 1327    Specimen:  Bile from Gallbladder Updated:  04/02/18 1515        ECG 12 Lead   Final Result   RR Interval= 625 ms   NV Interval= 128 ms   QRSD Interval= 78 ms   QT Interval= 348 ms   QTc Interval= 440 ms   Heart Rate= 96 ms   P Axis= 43 deg   QRS Axis= -38 deg   T Wave Axis= 43 deg   I: 40 Axis= 52 deg   T: 40 Axis= -45 deg   ST Axis= 72 deg   SINUS RHYTHM   LEFT AXIS DEVIATION   NO SIGNIFICANT CHANGE FROM PREVIOUS ECG   Electronically Signed by:  Samson Nayak ([facility]) 01-Apr-2018 19:59:15   Date and Time of Study: 2018-04-01 18:55:21           Nm Hepatobiliary Without Cck    Result Date: 4/2/2018  Nonvisualization of the gallbladder at 2 hours suspicious for acute cholecystitis. No biliary obstruction.  A preliminary report was provided by Dr. Collier at 3:50 PM on 4/1/2018.  This report was finalized on 4/2/2018 9:58 AM by Dr. Jatin Pollock MD.      Xr Chest Pa & Lateral    Result Date: 4/2/2018  No active disease.  A preliminary report was provided by Dr. Collier at 1540 hours on 4/1/2018.  This report was finalized on 4/2/2018 8:35 AM by Dr. Jatin Pollock MD.      ASSESSMENT/PLAN:  1. Acute cholecystitis: Postop day 1, Dr. Tamez managing  Change pain medication to hydrocodone 7.5 mg every 4 hours, continue Dilaudid 1 mg every 2 hours as needed for breakthrough pain  Continue Zosyn, tolerating clears, add  probiotic  WBCC 13.36  +GNB from gallbladder fluid, zosyn will cover  Monitor    2. Hepatic cirrhosis/elevated AST/hyperbilirubinemia/elevated alkaline phosphatase:   Cirrhosis visualized by Dr. Tamez during surgery  AST trending down, bilirubin remains up slightly, alk phos slightly lower  Recent hepatitis panel negative    3. Hypertension: blood pressure at goal on amlodipine 5 mg daily    4. Macrocytic anemia: suspected secondary to h/o etoh abuse  Hemoglobin stable at 11.9  No active blood loss noted  Check B12/folate    5. GERD: add protonix 40 mg daily    6. COPD: no acute issues on symbicort    7. H/O heavy alcohol use, none x 4 weeks per patient, no signs of withdrawal noed    8. H/O constipation/cold intolerance:  Add miralax    9. Tobacco abuse: nicotine patch in place, counseled

## 2018-04-04 LAB
ALBUMIN SERPL-MCNC: 3.2 G/DL (ref 3.5–5.2)
ALBUMIN/GLOB SERPL: 1.1 G/DL
ALP SERPL-CCNC: 218 U/L (ref 40–129)
ALT SERPL W P-5'-P-CCNC: 40 U/L (ref 5–41)
ANION GAP SERPL CALCULATED.3IONS-SCNC: 11.9 MMOL/L
AST SERPL-CCNC: 63 U/L (ref 5–40)
BASOPHILS # BLD AUTO: 0.09 10*3/MM3 (ref 0–0.2)
BASOPHILS NFR BLD AUTO: 0.8 % (ref 0–2)
BILIRUB SERPL-MCNC: 1.1 MG/DL (ref 0.2–1.2)
BUN BLD-MCNC: 7 MG/DL (ref 8–23)
BUN/CREAT SERPL: 8.6 (ref 7–25)
CALCIUM SPEC-SCNC: 8.4 MG/DL (ref 8.8–10.5)
CHLORIDE SERPL-SCNC: 103 MMOL/L (ref 98–107)
CO2 SERPL-SCNC: 25.1 MMOL/L (ref 22–29)
CREAT BLD-MCNC: 0.81 MG/DL (ref 0.76–1.27)
DEPRECATED RDW RBC AUTO: 54.4 FL (ref 37–54)
EOSINOPHIL # BLD AUTO: 0.06 10*3/MM3 (ref 0.1–0.3)
EOSINOPHIL NFR BLD AUTO: 0.5 % (ref 0–4)
ERYTHROCYTE [DISTWIDTH] IN BLOOD BY AUTOMATED COUNT: 13.5 % (ref 11.5–14.5)
FOLATE SERPL-MCNC: 3.11 NG/ML (ref 4.78–20)
GFR SERPL CREATININE-BSD FRML MDRD: 96 ML/MIN/1.73
GLOBULIN UR ELPH-MCNC: 3 GM/DL
GLUCOSE BLD-MCNC: 106 MG/DL (ref 65–99)
HCT VFR BLD AUTO: 34 % (ref 42–52)
HGB BLD-MCNC: 11 G/DL (ref 14–18)
IMM GRANULOCYTES # BLD: 0.05 10*3/MM3 (ref 0–0.03)
IMM GRANULOCYTES NFR BLD: 0.4 % (ref 0–0.5)
LAB AP CASE REPORT: NORMAL
LAB AP CLINICAL INFORMATION: NORMAL
LYMPHOCYTES # BLD AUTO: 1.75 10*3/MM3 (ref 0.6–4.8)
LYMPHOCYTES NFR BLD AUTO: 15.1 % (ref 20–45)
Lab: NORMAL
MCH RBC QN AUTO: 35.1 PG (ref 27–31)
MCHC RBC AUTO-ENTMCNC: 32.4 G/DL (ref 31–37)
MCV RBC AUTO: 108.6 FL (ref 80–94)
MONOCYTES # BLD AUTO: 0.86 10*3/MM3 (ref 0–1)
MONOCYTES NFR BLD AUTO: 7.4 % (ref 3–8)
NEUTROPHILS # BLD AUTO: 8.76 10*3/MM3 (ref 1.5–8.3)
NEUTROPHILS NFR BLD AUTO: 75.8 % (ref 45–70)
NRBC BLD MANUAL-RTO: 0 /100 WBC (ref 0–0)
PATH REPORT.FINAL DX SPEC: NORMAL
PLATELET # BLD AUTO: 224 10*3/MM3 (ref 140–500)
PMV BLD AUTO: 11.1 FL (ref 7.4–10.4)
POTASSIUM BLD-SCNC: 3.5 MMOL/L (ref 3.5–5.2)
PROT SERPL-MCNC: 6.2 G/DL (ref 6–8.5)
RBC # BLD AUTO: 3.13 10*6/MM3 (ref 4.7–6.1)
SODIUM BLD-SCNC: 140 MMOL/L (ref 136–145)
VIT B12 BLD-MCNC: 393 PG/ML (ref 232–1245)
WBC NRBC COR # BLD: 11.57 10*3/MM3 (ref 4.8–10.8)

## 2018-04-04 PROCEDURE — 85025 COMPLETE CBC W/AUTO DIFF WBC: CPT | Performed by: SURGERY

## 2018-04-04 PROCEDURE — 94799 UNLISTED PULMONARY SVC/PX: CPT

## 2018-04-04 PROCEDURE — 80053 COMPREHEN METABOLIC PANEL: CPT | Performed by: SURGERY

## 2018-04-04 PROCEDURE — 82746 ASSAY OF FOLIC ACID SERUM: CPT | Performed by: NURSE PRACTITIONER

## 2018-04-04 PROCEDURE — 25010000002 HYDROMORPHONE HCL PF 500 MG/50ML SOLUTION: Performed by: INTERNAL MEDICINE

## 2018-04-04 PROCEDURE — 82607 VITAMIN B-12: CPT | Performed by: NURSE PRACTITIONER

## 2018-04-04 PROCEDURE — 25010000002 PIPERACILLIN SOD-TAZOBACTAM PER 1 G: Performed by: INTERNAL MEDICINE

## 2018-04-04 PROCEDURE — 25010000002 ENOXAPARIN PER 10 MG: Performed by: SURGERY

## 2018-04-04 PROCEDURE — 99232 SBSQ HOSP IP/OBS MODERATE 35: CPT | Performed by: HOSPITALIST

## 2018-04-04 RX ORDER — LANOLIN ALCOHOL/MO/W.PET/CERES
800 CREAM (GRAM) TOPICAL DAILY
Status: DISCONTINUED | OUTPATIENT
Start: 2018-04-04 | End: 2018-04-06 | Stop reason: HOSPADM

## 2018-04-04 RX ADMIN — PIPERACILLIN SODIUM,TAZOBACTAM SODIUM 4.5 G: 4; .5 INJECTION, POWDER, FOR SOLUTION INTRAVENOUS at 18:41

## 2018-04-04 RX ADMIN — Medication 250 MG: at 20:49

## 2018-04-04 RX ADMIN — HYDROMORPHONE HYDROCHLORIDE 1 MG: 10 INJECTION INTRAMUSCULAR; INTRAVENOUS; SUBCUTANEOUS at 06:31

## 2018-04-04 RX ADMIN — HYDROCODONE BITARTRATE AND ACETAMINOPHEN 1 TABLET: 7.5; 325 TABLET ORAL at 00:59

## 2018-04-04 RX ADMIN — HYDROMORPHONE HYDROCHLORIDE 1 MG: 10 INJECTION INTRAMUSCULAR; INTRAVENOUS; SUBCUTANEOUS at 20:36

## 2018-04-04 RX ADMIN — ACETAMINOPHEN 800 MCG: 400 TABLET ORAL at 11:47

## 2018-04-04 RX ADMIN — BUDESONIDE AND FORMOTEROL FUMARATE DIHYDRATE 2 PUFF: 160; 4.5 AEROSOL RESPIRATORY (INHALATION) at 08:13

## 2018-04-04 RX ADMIN — PIPERACILLIN SODIUM,TAZOBACTAM SODIUM 4.5 G: 4; .5 INJECTION, POWDER, FOR SOLUTION INTRAVENOUS at 00:59

## 2018-04-04 RX ADMIN — SODIUM CHLORIDE 50 ML/HR: 9 INJECTION, SOLUTION INTRAVENOUS at 22:59

## 2018-04-04 RX ADMIN — POLYETHYLENE GLYCOL 3350 17 G: 17 POWDER, FOR SOLUTION ORAL at 10:04

## 2018-04-04 RX ADMIN — Medication 250 MG: at 10:05

## 2018-04-04 RX ADMIN — HYDROCODONE BITARTRATE AND ACETAMINOPHEN 1 TABLET: 7.5; 325 TABLET ORAL at 16:17

## 2018-04-04 RX ADMIN — ENOXAPARIN SODIUM 40 MG: 40 INJECTION SUBCUTANEOUS at 10:06

## 2018-04-04 RX ADMIN — PANTOPRAZOLE SODIUM 40 MG: 40 TABLET, DELAYED RELEASE ORAL at 05:15

## 2018-04-04 RX ADMIN — HYDROCODONE BITARTRATE AND ACETAMINOPHEN 1 TABLET: 7.5; 325 TABLET ORAL at 07:51

## 2018-04-04 RX ADMIN — PIPERACILLIN SODIUM,TAZOBACTAM SODIUM 4.5 G: 4; .5 INJECTION, POWDER, FOR SOLUTION INTRAVENOUS at 10:03

## 2018-04-04 RX ADMIN — HYDROCODONE BITARTRATE AND ACETAMINOPHEN 1 TABLET: 7.5; 325 TABLET ORAL at 11:47

## 2018-04-04 RX ADMIN — BUDESONIDE AND FORMOTEROL FUMARATE DIHYDRATE 2 PUFF: 160; 4.5 AEROSOL RESPIRATORY (INHALATION) at 20:44

## 2018-04-04 RX ADMIN — SENNOSIDES AND DOCUSATE SODIUM 2 TABLET: 8.6; 5 TABLET ORAL at 17:03

## 2018-04-04 RX ADMIN — AMLODIPINE BESYLATE 5 MG: 5 TABLET ORAL at 10:03

## 2018-04-04 RX ADMIN — NICOTINE 1 PATCH: 21 PATCH TRANSDERMAL at 20:37

## 2018-04-04 NOTE — PLAN OF CARE
Problem: Pain, Acute (Adult)  Goal: Acceptable Pain Control/Comfort Level  Outcome: Ongoing (interventions implemented as appropriate)   04/03/18 2751   Pain, Acute (Adult)   Acceptable Pain Control/Comfort Level making progress toward outcome

## 2018-04-04 NOTE — PLAN OF CARE
Problem: Patient Care Overview  Goal: Plan of Care Review  Outcome: Ongoing (interventions implemented as appropriate)   04/04/18 1303   Coping/Psychosocial   Plan of Care Reviewed With patient;spouse   Plan of Care Review   Progress improving   OTHER   Outcome Summary feeling better, pain control, ambulating in mcknight,advanced, to full liquids,     Goal: Individualization and Mutuality  Outcome: Ongoing (interventions implemented as appropriate)    Goal: Discharge Needs Assessment  Outcome: Ongoing (interventions implemented as appropriate)      Problem: Pain, Acute (Adult)  Goal: Acceptable Pain Control/Comfort Level  Outcome: Ongoing (interventions implemented as appropriate)      Problem: Surgery Nonspecified (Adult)  Goal: Signs and Symptoms of Listed Potential Problems Will be Absent, Minimized or Managed (Surgery Nonspecified)  Outcome: Outcome(s) achieved Date Met: 04/04/18    Goal: Anesthesia/Sedation Recovery  Outcome: Outcome(s) achieved Date Met: 04/04/18

## 2018-04-04 NOTE — PROGRESS NOTES
"Hospitalist Team      Patient Care Team:  No Known Provider as PCP - General        Chief Complaint:  F/U Acute cholecystitis    Subjective    Interval History and ROS:     Patient notes he is actually feeling good today. He notes his pain is controlled today. He denies any N/V. He denies any CP or SOA. BP and HR are WNL. He is afebrile. He is tolerating a clear liquid diet.       Objective    Vital Signs  Temp:  [97.8 °F (36.6 °C)-98.5 °F (36.9 °C)] 97.8 °F (36.6 °C)  Heart Rate:  [73-95] 73  Resp:  [17-20] 18  BP: (112-153)/(65-83) 124/73  Oxygen Therapy  SpO2: 94 %  Device (Oxygen Therapy): room air    Flowsheet Rows    Flowsheet Row First Filed Value   Admission Height 167 cm (65.75\") Documented at 03/31/2018 1737   Admission Weight 58.1 kg (128 lb) Documented at 03/31/2018 1737            Physical Exam:  Constitutional: Patient appears thin, older than his stated age and in no acute distress   HEENT:   Head: Normocephalic and atraumatic.   Eyes: EOM are intact. Sclera are anicteric and non-injected.  Mouth and Throat: Patient has moist mucous membranes.      Neck: Neck supple. No JVD present.   Cardiovascular: Regular rate, regular rhythm.  Exam reveals no gallop and no friction rub.  No murmur heard.  Pulmonary/Chest: Lungs with slightly diminished breath sounds throughout. No respiratory distress. No wheezes. No rhonchi. No rales.   Abdominal: Soft. Bowel sounds are normal. Incisions are open to air and C/D/I. No erythema.  Extremities: No edema. Pulses are palpable in all 4 extremities.  Neurological: Patient is alert and oriented to person, place, and time.   Skin: Skin is warm. No rash noted. No cyanosis or erythema.    Results Review:     I reviewed the patient's new clinical results.    Lab Results (last 24 hours)     Procedure Component Value Units Date/Time    Body Fluid Culture - Bile, Gallbladder [[id number]]  (Abnormal) Collected:  04/02/18 1331    Specimen:  Bile from Gallbladder Updated:  04/04/18 " 0926     BF Culture --      Scant growth (1+) Gram Negative Bacilli (A)     Gram Stain Result No organisms seen      Rare (1+) WBCs per low power field    Narrative:       4/4/18 ID/SON to follow    CBC & Differential [[id number]] Collected:  04/04/18 0422    Specimen:  Blood Updated:  04/04/18 0716    Narrative:       The following orders were created for panel order CBC & Differential.  Procedure                               Abnormality         Status                     ---------                               -----------         ------                     Scan Slide[[id number]]                                                                  CBC Auto Differential[[id number]]        Abnormal            Final result                 Please view results for these tests on the individual orders.    CBC Auto Differential [[id number]]  (Abnormal) Collected:  04/04/18 0422    Specimen:  Blood Updated:  04/04/18 0711     WBC 11.57 (H) 10*3/mm3      RBC 3.13 (L) 10*6/mm3      Hemoglobin 11.0 (L) g/dL      Hematocrit 34.0 (L) %      .6 (H) fL      MCH 35.1 (H) pg      MCHC 32.4 g/dL      RDW 13.5 %      RDW-SD 54.4 (H) fl      MPV 11.1 (H) fL      Platelets 224 10*3/mm3      Neutrophil % 75.8 (H) %      Lymphocyte % 15.1 (L) %      Monocyte % 7.4 %      Eosinophil % 0.5 %      Basophil % 0.8 %      Immature Grans % 0.4 %      Neutrophils, Absolute 8.76 (H) 10*3/mm3      Lymphocytes, Absolute 1.75 10*3/mm3      Monocytes, Absolute 0.86 10*3/mm3      Eosinophils, Absolute 0.06 (L) 10*3/mm3      Basophils, Absolute 0.09 10*3/mm3      Immature Grans, Absolute 0.05 (H) 10*3/mm3      nRBC 0.0 /100 WBC     Comprehensive Metabolic Panel [[id number]]  (Abnormal) Collected:  04/04/18 0422    Specimen:  Blood Updated:  04/04/18 0538     Glucose 106 (H) mg/dL      BUN 7 (L) mg/dL      Creatinine 0.81 mg/dL      Sodium 140 mmol/L      Potassium 3.5 mmol/L      Chloride 103 mmol/L      CO2 25.1 mmol/L      Calcium 8.4 (L) mg/dL       Total Protein 6.2 g/dL      Albumin 3.20 (L) g/dL      ALT (SGPT) 40 U/L      AST (SGOT) 63 (H) U/L      Alkaline Phosphatase 218 (H) U/L      Total Bilirubin 1.1 mg/dL      eGFR Non African Amer 96 mL/min/1.73      Globulin 3.0 gm/dL      A/G Ratio 1.1 g/dL      BUN/Creatinine Ratio 8.6     Anion Gap 11.9 mmol/L     Folate [[id number]]  (Abnormal) Collected:  04/04/18 0422    Specimen:  Blood Updated:  04/04/18 0523     Folate 3.11 (L) ng/mL     Vitamin B12 [[id number]]  (Normal) Collected:  04/04/18 0422    Specimen:  Blood Updated:  04/04/18 0522     Vitamin B-12 393 pg/mL           Imaging Results (last 24 hours)     ** No results found for the last 24 hours. **        ECG/EMG Results (most recent)     Procedure Component Value Units Date/Time    ECG 12 Lead [[id number]] Collected:  04/01/18 1855     Updated:  04/01/18 2000    Narrative:       RR Interval= 625 ms  IN Interval= 128 ms  QRSD Interval= 78 ms  QT Interval= 348 ms  QTc Interval= 440 ms  Heart Rate= 96 ms  P Axis= 43 deg  QRS Axis= -38 deg  T Wave Axis= 43 deg  I: 40 Axis= 52 deg  T: 40 Axis= -45 deg  ST Axis= 72 deg  SINUS RHYTHM  LEFT AXIS DEVIATION  NO SIGNIFICANT CHANGE FROM PREVIOUS ECG  Electronically Signed by:  [facility]) 01-Apr-2018 19:59:15  Date and Time of Study: 2018-04-01 18:55:21          Medication Review:   I have reviewed the patient's current medication list    Current Facility-Administered Medications:   •  acetaminophen (TYLENOL) tablet 650 mg, 650 mg, Oral, Q4H PRN, Mihaela Leslie MD  •  albuterol (PROVENTIL) nebulizer solution 0.083% 2.5 mg/3mL, 2.5 mg, Nebulization, Q6H PRN, Jaylin Matthew MD  •  amLODIPine (NORVASC) tablet 5 mg, 5 mg, Oral, Daily, Mihaela Leslie MD, 5 mg at 04/04/18 1003  •  bisacodyl (DULCOLAX) suppository 10 mg, 10 mg, Rectal, Daily PRN, Vin Bullard MD  •  budesonide-formoterol (SYMBICORT) 160-4.5 MCG/ACT inhaler 2 puff, 2 puff, Inhalation, BID, Mihaela SANDHU  MD Anuj, 2 puff at 04/04/18 0813  •  enoxaparin (LOVENOX) syringe 40 mg, 40 mg, Subcutaneous, Daily, Marley Tamez MD, 40 mg at 04/04/18 1006  •  folic acid (FOLVITE) tablet 800 mcg, 800 mcg, Oral, Daily, Jaylin Matthew MD, 800 mcg at 04/04/18 1147  •  HYDROcodone-acetaminophen (NORCO) 7.5-325 MG per tablet 1 tablet, 1 tablet, Oral, Q4H PRN, Niurka Iqbal, APRN, 1 tablet at 04/04/18 1617  •  HYDROmorphone (DILAUDID) injection 1 mg, 1 mg, Intravenous, Q2H PRN, Mihaela Leslie MD, 1 mg at 04/04/18 0631  •  nicotine (NICODERM CQ) 21 MG/24HR patch 1 patch, 1 patch, Transdermal, Q24H, Mihaela Leslie MD, 1 patch at 04/03/18 2144  •  ondansetron (ZOFRAN) tablet 4 mg, 4 mg, Oral, Q6H PRN **OR** ondansetron ODT (ZOFRAN-ODT) disintegrating tablet 4 mg, 4 mg, Oral, Q6H PRN **OR** ondansetron (ZOFRAN) injection 4 mg, 4 mg, Intravenous, Q6H PRN, Mihaela Leslie MD  •  ondansetron (ZOFRAN) tablet 4 mg, 4 mg, Oral, Q6H PRN **OR** ondansetron ODT (ZOFRAN-ODT) disintegrating tablet 4 mg, 4 mg, Oral, Q6H PRN **OR** ondansetron (ZOFRAN) injection 4 mg, 4 mg, Intravenous, Q6H PRN, Marley Tamez MD  •  pantoprazole (PROTONIX) EC tablet 40 mg, 40 mg, Oral, Q AM, Niurka Iqbal, APRN, 40 mg at 04/04/18 0515  •  piperacillin-tazobactam (ZOSYN) 4.5 g/100 mL 0.9% NS IVPB (mbp), 4.5 g, Intravenous, Q8H, Mihaela Leslie MD, Stopped at 04/04/18 1410  •  polyethylene glycol 3350 powder (packet), 17 g, Oral, Daily, Niurka Iqbal, APRN, 17 g at 04/04/18 1004  •  promethazine (PHENERGAN) injection 12.5 mg, 12.5 mg, Intravenous, Q6H PRN, 12.5 mg at 04/03/18 0307 **OR** promethazine (PHENERGAN) injection 12.5 mg, 12.5 mg, Intramuscular, Q6H PRN, Marley Tamez MD  •  saccharomyces boulardii (FLORASTOR) capsule 250 mg, 250 mg, Oral, BID, Niurka Iqbal, APRN, 250 mg at 04/04/18 1005  •  sennosides-docusate sodium (SENOKOT-S) 8.6-50 MG tablet 2 tablet, 2 tablet, Oral, Nightly PRN,  Mihaela Leslie MD, 2 tablet at 04/04/18 1703  •  sodium chloride 0.9 % flush 1-10 mL, 1-10 mL, Intravenous, PRN, Mihaela Leslie MD  •  Insert peripheral IV, , , Once **AND** sodium chloride 0.9 % flush 10 mL, 10 mL, Intravenous, PRN, Adriana Gurrola PA-C  •  sodium chloride 0.9 % infusion 40 mL, 40 mL, Intravenous, PRN, Mihaela Leslie MD  •  sodium chloride 0.9 % infusion, 100 mL/hr, Intravenous, Continuous, Jaylin Matthew MD, Stopped at 04/04/18 1153      Assessment/Plan     1. Acute cholecystitis: POD #2 from Laparoscopic converted to open cholecystectomy (done by Dr. Tamez).  Patient's bili today is NL. H/o elevated LFTs that has been W/U by Dr. Johnson's in the past (see #2 below). Patient WBC now near NL and he is afebrile. Continue zosyn and probiotic. Pain controlled with mostly po narcotics today. Surgery managing and diet advanced to full liquids by Dr. Bullard today.  Culture from surgery with GNB, awaiting final ID. Monitor. Will decreased IVF rate as patient is tolerating liquids.    2. Hepatic cirrhosis, elevated AST, Alk phos and Bili: Cirrhosis visualized by Dr. Tamez during surgery (I suspect secondary to the patient's years of EtOH abuse). Bili Nl today. Recent hepatitis panel was NL. I would recommend patient F/U with hepatology outpatient.    3. HTN: BP is intermittently elevated on home dose Norvasc. Llikely secondary to pain here. Monitor.       4. COPD: Patient on home Symbicort. No acute issues here.      5. GERD: On PPI, no acute issues.       6. H/O constipation and cold intolerance: TSH is WNL. On miralax.     7. H/O Heavy EtOH use: Patient without alcohol now for 4-6 weeks so no concerns for withdrawal here.     8. Tobacco Abuse: Nicotine patch, cessation education.    9. Anemia and folate deficiency: Will add folate supplementation here. F/U with PCP outpatient. B12 WNL. Monitor.    Plan for disposition: Home when ok with surgery.    Jaylin Matthew  MD  04/04/18  6:27 PM

## 2018-04-04 NOTE — PLAN OF CARE
Problem: Surgery Nonspecified (Adult)  Goal: Signs and Symptoms of Listed Potential Problems Will be Absent, Minimized or Managed (Surgery Nonspecified)  Outcome: Ongoing (interventions implemented as appropriate)   04/03/18 3241   Goal/Outcome Evaluation   Problems Assessed (Surgery) bleeding;pain;postoperative nausea and vomiting;wound healing impaired   Problems Present (Surgery) pain

## 2018-04-04 NOTE — PROGRESS NOTES
BGA/GI Progress Note   Chief Complaint:  Acute cholecystitis    Subjective     She is postoperative day 2 status post laparoscopic conversion to open cholecystectomy.  States he is doing well.  Denies any nausea.  Tolerating clear liquids without difficulty.  Still has not had a bowel movement yet.  However is passing flatus.  Is ambulatory.    Objective     Vital Signs  Temp:  [97.8 °F (36.6 °C)-98.5 °F (36.9 °C)] 98.2 °F (36.8 °C)  Heart Rate:  [74-95] 91  Resp:  [16-20] 18  BP: (112-140)/(65-77) 140/74  Body mass index is 20.83 kg/m².    Intake/Output Summary (Last 24 hours) at 04/04/18 1138  Last data filed at 04/04/18 1003   Gross per 24 hour   Intake             1560 ml   Output              610 ml   Net              950 ml     I/O this shift:  In: 460 [P.O.:360; IV Piggyback:100]  Out: 10 [Drains:10]       Physical Exam:   General: patient awake, alert and cooperative   Abdomen: soft, nontender, nondistended; normal bowel sounds                      Dressing changed.  Incision healing well no cellulitis or                                              drainage noted.  SANAZ with 10 cc of serosanguineous drainage.   Extremities: no rash or edema   Neurologic: Normal mood and behavior     Results Review:     I reviewed the patient's new clinical results.      WBC No results found for: WBCS   HGB Hemoglobin   Date Value Ref Range Status   04/04/2018 11.0 (L) 14.0 - 18.0 g/dL Final   04/03/2018 11.9 (L) 14.0 - 18.0 g/dL Final   04/02/2018 12.6 (L) 14.0 - 18.0 g/dL Final      HCT Hematocrit   Date Value Ref Range Status   04/04/2018 34.0 (L) 42.0 - 52.0 % Final   04/03/2018 35.6 (L) 42.0 - 52.0 % Final   04/02/2018 37.7 (L) 42.0 - 52.0 % Final      Platlets No results found for: LABPLAT     PT/INR:    Protime   Date Value Ref Range Status   04/02/2018 14.7 12.1 - 15.0 Seconds Final   /  INR   Date Value Ref Range Status   04/02/2018 1.14 (H) 0.90 - 1.10 Final       Sodium Sodium   Date Value Ref Range Status    04/04/2018 140 136 - 145 mmol/L Final   04/03/2018 137 136 - 145 mmol/L Final   04/02/2018 137 136 - 145 mmol/L Final      Potassium Potassium   Date Value Ref Range Status   04/04/2018 3.5 3.5 - 5.2 mmol/L Final   04/03/2018 4.1 3.5 - 5.2 mmol/L Final   04/02/2018 3.6 3.5 - 5.2 mmol/L Final      Chloride Chloride   Date Value Ref Range Status   04/04/2018 103 98 - 107 mmol/L Final   04/03/2018 102 98 - 107 mmol/L Final   04/02/2018 100 98 - 107 mmol/L Final      Bicarbonate No results found for: PLASMABICARB   BUN BUN   Date Value Ref Range Status   04/04/2018 7 (L) 8 - 23 mg/dL Final   04/03/2018 9 8 - 23 mg/dL Final   04/02/2018 6 (L) 8 - 23 mg/dL Final      Creatinine Creatinine   Date Value Ref Range Status   04/04/2018 0.81 0.76 - 1.27 mg/dL Final   04/03/2018 0.77 0.76 - 1.27 mg/dL Final   04/02/2018 0.75 (L) 0.76 - 1.27 mg/dL Final      Calcium Calcium   Date Value Ref Range Status   04/04/2018 8.4 (L) 8.8 - 10.5 mg/dL Final   04/03/2018 8.7 (L) 8.8 - 10.5 mg/dL Final   04/02/2018 8.7 (L) 8.8 - 10.5 mg/dL Final      Magnesium  AST  ALT  Bilirubin, Total  AlkPhos  Albumin    Amylase  Lipase    Radiology: No results found for: MG  No components found for: AST.*  No components found for: ALT.*  No components found for: BILIRUBIN, TOTAL.*    No components found for: ALKPHOS.*  No components found for: ALBUMIN.*      No components found for: AMYLASE.*  No components found for: LIPASE.*            Imaging Results (most recent)     Procedure Component Value Units Date/Time    US Abdomen Complete [[id number]] Collected:  04/02/18 1000     Updated:  04/02/18 1009    Narrative:       INDICATION: Abdominal pain for 2 days.     EXAM: Abdominal ultrasound, complete.     COMPARISON: CT abdomen 3/31/2018     FINDINGS:  Pancreas is normal. Liver parenchyma is homogeneous with no focal mass.  There is a tiny cyst in the left hepatic lobe. Gallbladder is distended  with a mildly thickened wall and a trace amount of  pericholecystic  fluid. Gallbladder sludge is noted. There may be some adenomyomatosis in  the gallbladder wall. Common duct is prominent for size and 7 mm. No  stone is seen within the duct. IVC is patent by Doppler. Abdominal aorta  is normal in caliber. Spleen size is normal with a ghxa-mp-bdpt length  of 11.4 cm. Both kidneys are morphologically normal and nonobstructed.       Impression:          1. Distended gallbladder with mildly thickened wall and some  pericholecystic fluid. There is also gallbladder sludge and probably  adenomyomatosis of the gallbladder wall. Common duct is prominent as  well at 7 mm.  2. The remainder of the abdominal ultrasound is negative.     A preliminary report was provided by Dr. Hinds at 1115 hours on 4/1/2018.     This report was finalized on 4/2/2018 10:07 AM by Dr. Jatin Pollock MD.       NM Hepatobiliary Without CCK [[id number]] Collected:  04/02/18 0955     Updated:  04/02/18 1000    Narrative:       Hepatobiliary scan     INDICATION: Right upper quadrant pain with fever and elevated white  blood cell count. Abnormal CT 3/31/2018 showing cystic duct stone.     FINDINGS: Imaging was obtained of the abdomen for 2 hours after the IV  administration of 5.7 mCi of technetium 99m-Choletec. COMPARISON made  with CT abdomen 3/31/2018.     There is prompt uptake by the liver with prompt biliary and small bowel  activity seen within 15 minutes. Gallbladder is not seen on the  examination. This is concerning for cystic duct obstruction and acute  cholecystitis.       Impression:       Nonvisualization of the gallbladder at 2 hours suspicious  for acute cholecystitis. No biliary obstruction.     A preliminary report was provided by Dr. Collier at 3:50 PM on 4/1/2018.     This report was finalized on 4/2/2018 9:58 AM by Dr. Jatin Pollock MD.       XR Chest PA & Lateral [[id number]] Collected:  04/02/18 0834     Updated:  04/02/18 0837    Narrative:       Chest x-ray     INDICATION:  Preoperative exam for cholecystectomy. COPD. Reflux disease.     FINDINGS: PA and lateral views of the chest were obtained. No  comparison. Lungs are clear. Cardiac and mediastinal contours are  normal. No pneumothorax.       Impression:       No active disease.     A preliminary report was provided by Dr. Collier at 1540 hours on  4/1/2018.     This report was finalized on 4/2/2018 8:35 AM by Dr. Jatin Pollock MD.       CT Abdomen Pelvis With Contrast [[id number]] Collected:  04/01/18 0736     Updated:  04/01/18 0741    Narrative:       CT ABDOMEN AND PELVIS WITH INTRAVENOUS CONTRAST     HISTORY:  Diffuse abdominal pain worsening over the past day. Colonoscopy  yesterday.     TECHNIQUE:  Axial images were obtained with intravenous contrast. 100 cc of Isovue  was used. COMPARISON from 2/16/2018. 1 previous CT and no nuclear  cardiac studies over the past year. Radiation dose reduction techniques  were utilized, including automated exposure control and exposure  modulation based on body size.     ABDOMEN FINDINGS:  The lung bases are clear. There is no evidence of free air in the  peritoneal cavity. Fatty infiltration of the liver is noted. Shows  improvement since the scan of 2/16/2018. A small hepatic cyst is  unchanged. The spleen kidneys adrenals and pancreas have a normal  appearance. The gallbladder is distended and there is a small  pericholecystic fluid collection that was not seen on the previous exam.  There is a small partially calcified gallstone in the gallbladder neck  or cystic duct. The common bile duct is nondilated. FINDINGS worrisome  for early cholecystitis. There is no evidence of retroperitoneal  adenopathy or ascites and no distended bowel loops are seen.     PELVIS FINDINGS:  In the pelvis there is no evidence of adenopathy mass or fluid  collection. Normal appendix.       Impression:       Small cystic duct stone with pericholecystic fluid  suspicious for early cholecystitis. Improvement in  hepatic steatosis.  Normal appendix. No evidence of free air in the peritoneal cavity.     This report was finalized on 4/1/2018 7:39 AM by Dr. Jatin Braga MD.                                        sodium chloride 100 mL/hr Last Rate: 100 mL/hr (04/03/18 1429)           Assessment/Plan     Patient Active Problem List   Diagnosis Code   • Encounter for screening for malignant neoplasm of colon Z12.11   • Elevated LFTs R79.89   • Abdominal pain R10.9   • Acute cholecystitis K81.0   • Right upper quadrant abdominal pain R10.11       Doing well postoperatively.  Bilirubin is now normal today.  We'll advance his diet.  Continue his antibiotics for now.        Vin Bullard MD  04/04/18  11:38 AM

## 2018-04-05 LAB
ALBUMIN SERPL-MCNC: 3.3 G/DL (ref 3.5–5.2)
ALBUMIN/GLOB SERPL: 1 G/DL
ALP SERPL-CCNC: 320 U/L (ref 40–129)
ALT SERPL W P-5'-P-CCNC: 48 U/L (ref 5–41)
ANION GAP SERPL CALCULATED.3IONS-SCNC: 14.3 MMOL/L
AST SERPL-CCNC: 87 U/L (ref 5–40)
BACTERIA FLD CULT: ABNORMAL
BACTERIA FLD CULT: ABNORMAL
BASOPHILS # BLD AUTO: 0.11 10*3/MM3 (ref 0–0.2)
BASOPHILS NFR BLD AUTO: 1 % (ref 0–2)
BILIRUB SERPL-MCNC: 1.4 MG/DL (ref 0.2–1.2)
BUN BLD-MCNC: 5 MG/DL (ref 8–23)
BUN/CREAT SERPL: 7.8 (ref 7–25)
CALCIUM SPEC-SCNC: 8.7 MG/DL (ref 8.8–10.5)
CHLORIDE SERPL-SCNC: 100 MMOL/L (ref 98–107)
CO2 SERPL-SCNC: 23.7 MMOL/L (ref 22–29)
CREAT BLD-MCNC: 0.64 MG/DL (ref 0.76–1.27)
DEPRECATED RDW RBC AUTO: 51.8 FL (ref 37–54)
EOSINOPHIL # BLD AUTO: 0.15 10*3/MM3 (ref 0.1–0.3)
EOSINOPHIL NFR BLD AUTO: 1.4 % (ref 0–4)
ERYTHROCYTE [DISTWIDTH] IN BLOOD BY AUTOMATED COUNT: 13.3 % (ref 11.5–14.5)
GFR SERPL CREATININE-BSD FRML MDRD: 126 ML/MIN/1.73
GLOBULIN UR ELPH-MCNC: 3.2 GM/DL
GLUCOSE BLD-MCNC: 93 MG/DL (ref 65–99)
GRAM STN SPEC: ABNORMAL
GRAM STN SPEC: ABNORMAL
HCT VFR BLD AUTO: 35.3 % (ref 42–52)
HGB BLD-MCNC: 11.8 G/DL (ref 14–18)
IMM GRANULOCYTES # BLD: 0.06 10*3/MM3 (ref 0–0.03)
IMM GRANULOCYTES NFR BLD: 0.5 % (ref 0–0.5)
LAB AP CASE REPORT: NORMAL
LAB AP CLINICAL INFORMATION: NORMAL
LYMPHOCYTES # BLD AUTO: 1.54 10*3/MM3 (ref 0.6–4.8)
LYMPHOCYTES NFR BLD AUTO: 14.1 % (ref 20–45)
Lab: NORMAL
MAGNESIUM SERPL-MCNC: 2.2 MG/DL (ref 1.7–2.5)
MCH RBC QN AUTO: 35.2 PG (ref 27–31)
MCHC RBC AUTO-ENTMCNC: 33.4 G/DL (ref 31–37)
MCV RBC AUTO: 105.4 FL (ref 80–94)
MONOCYTES # BLD AUTO: 0.94 10*3/MM3 (ref 0–1)
MONOCYTES NFR BLD AUTO: 8.6 % (ref 3–8)
NEUTROPHILS # BLD AUTO: 8.15 10*3/MM3 (ref 1.5–8.3)
NEUTROPHILS NFR BLD AUTO: 74.4 % (ref 45–70)
NRBC BLD MANUAL-RTO: 0 /100 WBC (ref 0–0)
PATH REPORT.FINAL DX SPEC: NORMAL
PLATELET # BLD AUTO: 293 10*3/MM3 (ref 140–500)
PMV BLD AUTO: 11.1 FL (ref 7.4–10.4)
POTASSIUM BLD-SCNC: 3.1 MMOL/L (ref 3.5–5.2)
PROT SERPL-MCNC: 6.5 G/DL (ref 6–8.5)
RBC # BLD AUTO: 3.35 10*6/MM3 (ref 4.7–6.1)
SODIUM BLD-SCNC: 138 MMOL/L (ref 136–145)
WBC NRBC COR # BLD: 10.95 10*3/MM3 (ref 4.8–10.8)

## 2018-04-05 PROCEDURE — 25010000002 PIPERACILLIN SOD-TAZOBACTAM PER 1 G: Performed by: INTERNAL MEDICINE

## 2018-04-05 PROCEDURE — 94799 UNLISTED PULMONARY SVC/PX: CPT

## 2018-04-05 PROCEDURE — 25010000002 ENOXAPARIN PER 10 MG: Performed by: SURGERY

## 2018-04-05 PROCEDURE — 25010000002 HYDROMORPHONE HCL PF 500 MG/50ML SOLUTION: Performed by: INTERNAL MEDICINE

## 2018-04-05 PROCEDURE — 80053 COMPREHEN METABOLIC PANEL: CPT | Performed by: HOSPITALIST

## 2018-04-05 PROCEDURE — 99232 SBSQ HOSP IP/OBS MODERATE 35: CPT | Performed by: HOSPITALIST

## 2018-04-05 PROCEDURE — 85025 COMPLETE CBC W/AUTO DIFF WBC: CPT | Performed by: HOSPITALIST

## 2018-04-05 PROCEDURE — 83735 ASSAY OF MAGNESIUM: CPT | Performed by: HOSPITALIST

## 2018-04-05 RX ORDER — MAGNESIUM SULFATE HEPTAHYDRATE 40 MG/ML
4 INJECTION, SOLUTION INTRAVENOUS AS NEEDED
Status: DISCONTINUED | OUTPATIENT
Start: 2018-04-05 | End: 2018-04-06 | Stop reason: HOSPADM

## 2018-04-05 RX ORDER — POTASSIUM CHLORIDE 20 MEQ/1
40 TABLET, EXTENDED RELEASE ORAL AS NEEDED
Status: DISCONTINUED | OUTPATIENT
Start: 2018-04-05 | End: 2018-04-06 | Stop reason: HOSPADM

## 2018-04-05 RX ORDER — POTASSIUM CHLORIDE 1.5 G/1.77G
40 POWDER, FOR SOLUTION ORAL AS NEEDED
Status: DISCONTINUED | OUTPATIENT
Start: 2018-04-05 | End: 2018-04-06 | Stop reason: HOSPADM

## 2018-04-05 RX ORDER — MAGNESIUM SULFATE HEPTAHYDRATE 40 MG/ML
2 INJECTION, SOLUTION INTRAVENOUS AS NEEDED
Status: DISCONTINUED | OUTPATIENT
Start: 2018-04-05 | End: 2018-04-06 | Stop reason: HOSPADM

## 2018-04-05 RX ORDER — MAGNESIUM SULFATE 1 G/100ML
1 INJECTION INTRAVENOUS AS NEEDED
Status: DISCONTINUED | OUTPATIENT
Start: 2018-04-05 | End: 2018-04-06 | Stop reason: HOSPADM

## 2018-04-05 RX ORDER — AMOXICILLIN AND CLAVULANATE POTASSIUM 875; 125 MG/1; MG/1
1 TABLET, FILM COATED ORAL EVERY 12 HOURS SCHEDULED
Status: DISCONTINUED | OUTPATIENT
Start: 2018-04-05 | End: 2018-04-06 | Stop reason: HOSPADM

## 2018-04-05 RX ORDER — POTASSIUM CHLORIDE 7.45 MG/ML
10 INJECTION INTRAVENOUS
Status: DISCONTINUED | OUTPATIENT
Start: 2018-04-05 | End: 2018-04-06 | Stop reason: HOSPADM

## 2018-04-05 RX ADMIN — PANTOPRAZOLE SODIUM 40 MG: 40 TABLET, DELAYED RELEASE ORAL at 05:31

## 2018-04-05 RX ADMIN — Medication 250 MG: at 10:05

## 2018-04-05 RX ADMIN — AMLODIPINE BESYLATE 5 MG: 5 TABLET ORAL at 08:01

## 2018-04-05 RX ADMIN — NICOTINE 1 PATCH: 21 PATCH TRANSDERMAL at 20:08

## 2018-04-05 RX ADMIN — ACETAMINOPHEN 800 MCG: 400 TABLET ORAL at 10:36

## 2018-04-05 RX ADMIN — HYDROCODONE BITARTRATE AND ACETAMINOPHEN 1 TABLET: 7.5; 325 TABLET ORAL at 08:01

## 2018-04-05 RX ADMIN — BUDESONIDE AND FORMOTEROL FUMARATE DIHYDRATE 2 PUFF: 160; 4.5 AEROSOL RESPIRATORY (INHALATION) at 09:51

## 2018-04-05 RX ADMIN — BUDESONIDE AND FORMOTEROL FUMARATE DIHYDRATE 2 PUFF: 160; 4.5 AEROSOL RESPIRATORY (INHALATION) at 20:17

## 2018-04-05 RX ADMIN — POTASSIUM CHLORIDE 40 MEQ: 1.5 POWDER, FOR SOLUTION ORAL at 15:46

## 2018-04-05 RX ADMIN — POLYETHYLENE GLYCOL 3350 17 G: 17 POWDER, FOR SOLUTION ORAL at 08:01

## 2018-04-05 RX ADMIN — ENOXAPARIN SODIUM 40 MG: 40 INJECTION SUBCUTANEOUS at 08:01

## 2018-04-05 RX ADMIN — HYDROMORPHONE HYDROCHLORIDE 1 MG: 10 INJECTION INTRAMUSCULAR; INTRAVENOUS; SUBCUTANEOUS at 03:01

## 2018-04-05 RX ADMIN — AMOXICILLIN AND CLAVULANATE POTASSIUM 1 TABLET: 875; 125 TABLET, FILM COATED ORAL at 20:06

## 2018-04-05 RX ADMIN — PIPERACILLIN SODIUM,TAZOBACTAM SODIUM 4.5 G: 4; .5 INJECTION, POWDER, FOR SOLUTION INTRAVENOUS at 02:51

## 2018-04-05 RX ADMIN — PIPERACILLIN SODIUM,TAZOBACTAM SODIUM 4.5 G: 4; .5 INJECTION, POWDER, FOR SOLUTION INTRAVENOUS at 10:36

## 2018-04-05 RX ADMIN — POTASSIUM CHLORIDE 40 MEQ: 1.5 POWDER, FOR SOLUTION ORAL at 11:12

## 2018-04-05 RX ADMIN — Medication 250 MG: at 20:06

## 2018-04-05 NOTE — PROGRESS NOTES
BGA/GI Progress Note   Chief Complaint: Cholelithiasis     Subjective     Patient's postoperative day #3 status post laparoscopic conversion open cholecystectomy.  He is doing well.  No complaints today.  Tolerating his diet without difficulty.  Ambulating well.      Objective     Vital Signs  Temp:  [97.2 °F (36.2 °C)-98.5 °F (36.9 °C)] 97.9 °F (36.6 °C)  Heart Rate:  [73-91] 88  Resp:  [16-20] 20  BP: (124-161)/(73-91) 144/79  Body mass index is 20.83 kg/m².    Intake/Output Summary (Last 24 hours) at 04/05/18 1011  Last data filed at 04/05/18 0803   Gross per 24 hour   Intake             1280 ml   Output              865 ml   Net              415 ml     No intake/output data recorded.       Physical Exam:   General: patient awake, alert and cooperative   Abdomen: soft, nontender, nondistended; normal bowel sounds                      Incision healing well.  SANAZ drainage serous.   Extremities: no rash or edema   Neurologic: Normal mood and behavior     Results Review:     I reviewed the patient's new clinical results.      WBC No results found for: WBCS   HGB Hemoglobin   Date Value Ref Range Status   04/05/2018 11.8 (L) 14.0 - 18.0 g/dL Final   04/04/2018 11.0 (L) 14.0 - 18.0 g/dL Final   04/03/2018 11.9 (L) 14.0 - 18.0 g/dL Final      HCT Hematocrit   Date Value Ref Range Status   04/05/2018 35.3 (L) 42.0 - 52.0 % Final   04/04/2018 34.0 (L) 42.0 - 52.0 % Final   04/03/2018 35.6 (L) 42.0 - 52.0 % Final      Platlets No results found for: LABPLAT     PT/INR:  No results found for: PROTIME/No results found for: INR    Sodium Sodium   Date Value Ref Range Status   04/05/2018 138 136 - 145 mmol/L Final   04/04/2018 140 136 - 145 mmol/L Final   04/03/2018 137 136 - 145 mmol/L Final      Potassium Potassium   Date Value Ref Range Status   04/05/2018 3.1 (L) 3.5 - 5.2 mmol/L Final   04/04/2018 3.5 3.5 - 5.2 mmol/L Final   04/03/2018 4.1 3.5 - 5.2 mmol/L Final      Chloride Chloride   Date Value Ref Range Status    04/05/2018 100 98 - 107 mmol/L Final   04/04/2018 103 98 - 107 mmol/L Final   04/03/2018 102 98 - 107 mmol/L Final      Bicarbonate No results found for: PLASMABICARB   BUN BUN   Date Value Ref Range Status   04/05/2018 5 (L) 8 - 23 mg/dL Final   04/04/2018 7 (L) 8 - 23 mg/dL Final   04/03/2018 9 8 - 23 mg/dL Final      Creatinine Creatinine   Date Value Ref Range Status   04/05/2018 0.64 (L) 0.76 - 1.27 mg/dL Final   04/04/2018 0.81 0.76 - 1.27 mg/dL Final   04/03/2018 0.77 0.76 - 1.27 mg/dL Final      Calcium Calcium   Date Value Ref Range Status   04/05/2018 8.7 (L) 8.8 - 10.5 mg/dL Final   04/04/2018 8.4 (L) 8.8 - 10.5 mg/dL Final   04/03/2018 8.7 (L) 8.8 - 10.5 mg/dL Final      Magnesium  AST  ALT  Bilirubin, Total  AlkPhos  Albumin    Amylase  Lipase    Radiology: No results found for: MG  No components found for: AST.*  No components found for: ALT.*  No components found for: BILIRUBIN, TOTAL.*    No components found for: ALKPHOS.*  No components found for: ALBUMIN.*      No components found for: AMYLASE.*  No components found for: LIPASE.*            Imaging Results (most recent)     Procedure Component Value Units Date/Time    US Abdomen Complete [[id number]] Collected:  04/02/18 1000     Updated:  04/02/18 1009    Narrative:       INDICATION: Abdominal pain for 2 days.     EXAM: Abdominal ultrasound, complete.     COMPARISON: CT abdomen 3/31/2018     FINDINGS:  Pancreas is normal. Liver parenchyma is homogeneous with no focal mass.  There is a tiny cyst in the left hepatic lobe. Gallbladder is distended  with a mildly thickened wall and a trace amount of pericholecystic  fluid. Gallbladder sludge is noted. There may be some adenomyomatosis in  the gallbladder wall. Common duct is prominent for size and 7 mm. No  stone is seen within the duct. IVC is patent by Doppler. Abdominal aorta  is normal in caliber. Spleen size is normal with a fqah-bz-vjtt length  of 11.4 cm. Both kidneys are morphologically  normal and nonobstructed.       Impression:          1. Distended gallbladder with mildly thickened wall and some  pericholecystic fluid. There is also gallbladder sludge and probably  adenomyomatosis of the gallbladder wall. Common duct is prominent as  well at 7 mm.  2. The remainder of the abdominal ultrasound is negative.     A preliminary report was provided by Dr. Hinds at 1115 hours on 4/1/2018.     This report was finalized on 4/2/2018 10:07 AM by Dr. Jatin Pollock MD.       NM Hepatobiliary Without CCK [[id number]] Collected:  04/02/18 0955     Updated:  04/02/18 1000    Narrative:       Hepatobiliary scan     INDICATION: Right upper quadrant pain with fever and elevated white  blood cell count. Abnormal CT 3/31/2018 showing cystic duct stone.     FINDINGS: Imaging was obtained of the abdomen for 2 hours after the IV  administration of 5.7 mCi of technetium 99m-Choletec. COMPARISON made  with CT abdomen 3/31/2018.     There is prompt uptake by the liver with prompt biliary and small bowel  activity seen within 15 minutes. Gallbladder is not seen on the  examination. This is concerning for cystic duct obstruction and acute  cholecystitis.       Impression:       Nonvisualization of the gallbladder at 2 hours suspicious  for acute cholecystitis. No biliary obstruction.     A preliminary report was provided by Dr. Collier at 3:50 PM on 4/1/2018.     This report was finalized on 4/2/2018 9:58 AM by Dr. Jatin Pollock MD.       XR Chest PA & Lateral [[id number]] Collected:  04/02/18 0834     Updated:  04/02/18 0837    Narrative:       Chest x-ray     INDICATION: Preoperative exam for cholecystectomy. COPD. Reflux disease.     FINDINGS: PA and lateral views of the chest were obtained. No  comparison. Lungs are clear. Cardiac and mediastinal contours are  normal. No pneumothorax.       Impression:       No active disease.     A preliminary report was provided by Dr. Collier at 1540 hours on  4/1/2018.     This  report was finalized on 4/2/2018 8:35 AM by Dr. Jatin Pollock MD.       CT Abdomen Pelvis With Contrast [[id number]] Collected:  04/01/18 0736     Updated:  04/01/18 0741    Narrative:       CT ABDOMEN AND PELVIS WITH INTRAVENOUS CONTRAST     HISTORY:  Diffuse abdominal pain worsening over the past day. Colonoscopy  yesterday.     TECHNIQUE:  Axial images were obtained with intravenous contrast. 100 cc of Isovue  was used. COMPARISON from 2/16/2018. 1 previous CT and no nuclear  cardiac studies over the past year. Radiation dose reduction techniques  were utilized, including automated exposure control and exposure  modulation based on body size.     ABDOMEN FINDINGS:  The lung bases are clear. There is no evidence of free air in the  peritoneal cavity. Fatty infiltration of the liver is noted. Shows  improvement since the scan of 2/16/2018. A small hepatic cyst is  unchanged. The spleen kidneys adrenals and pancreas have a normal  appearance. The gallbladder is distended and there is a small  pericholecystic fluid collection that was not seen on the previous exam.  There is a small partially calcified gallstone in the gallbladder neck  or cystic duct. The common bile duct is nondilated. FINDINGS worrisome  for early cholecystitis. There is no evidence of retroperitoneal  adenopathy or ascites and no distended bowel loops are seen.     PELVIS FINDINGS:  In the pelvis there is no evidence of adenopathy mass or fluid  collection. Normal appendix.       Impression:       Small cystic duct stone with pericholecystic fluid  suspicious for early cholecystitis. Improvement in hepatic steatosis.  Normal appendix. No evidence of free air in the peritoneal cavity.     This report was finalized on 4/1/2018 7:39 AM by Dr. Jatin Braga MD.                                        sodium chloride 50 mL/hr Last Rate: 50 mL/hr (04/04/18 2259)           Assessment/Plan     Patient Active Problem List   Diagnosis Code   • Encounter for  screening for malignant neoplasm of colon Z12.11   • Elevated LFTs R79.89   • Abdominal pain R10.9   • Acute cholecystitis K81.0   • Right upper quadrant abdominal pain R10.11       Doing well postop .  We'll advance his diet.  Hep-Lock his IV. Anticipated discharge tomorrow if he does well.        Vin Bullard MD  04/05/18  10:11 AM

## 2018-04-05 NOTE — PROGRESS NOTES
"Hospitalist Team      Patient Care Team:  No Known Provider as PCP - General        Chief Complaint:  F/U Acute cholecystitis    Subjective    Interval History and ROS:     Patient notes he is feeling good today. He was tolerating his full liquids this AM. He notes his pain is controlled. He denies any CP, SOA or N/V. He has been ambulating frequently in the halls. He is afebrile. BP intermittently elevated.       Objective    Vital Signs  Temp:  [97.2 °F (36.2 °C)-98.5 °F (36.9 °C)] 98.3 °F (36.8 °C)  Heart Rate:  [73-91] 82  Resp:  [16-20] 18  BP: (126-161)/(75-91) 144/85  Oxygen Therapy  SpO2: 95 %  Pulse Oximetry Type: Intermittent  Device (Oxygen Therapy): room air    Flowsheet Rows    Flowsheet Row First Filed Value   Admission Height 167 cm (65.75\") Documented at 03/31/2018 1737   Admission Weight 58.1 kg (128 lb) Documented at 03/31/2018 1737            Physical Exam:  Constitutional: Patient appears thin, older than his stated age and in no acute distress   HEENT:   Head: Normocephalic and atraumatic.   Eyes: EOM are intact. Sclera are anicteric and non-injected.  Mouth and Throat: Patient has moist mucous membranes.      Neck: Neck supple. No JVD present.   Cardiovascular: Regular rate, regular rhythm.  Exam reveals no gallop and no friction rub.  No murmur heard.  Pulmonary/Chest: Lungs with slightly diminished breath sounds throughout. No respiratory distress. No wheezes. No rhonchi. No rales.   Abdominal: Soft. Bowel sounds are normal. Incisions are C/D/I. No erythema.  Extremities: No edema. Pulses are palpable in all 4 extremities.  Neurological: Patient is alert and oriented to person, place, and time.   Skin: Skin is warm and dry. No cyanosis or erythema.    Results Review:     I reviewed the patient's new clinical results.    Lab Results (last 24 hours)     Procedure Component Value Units Date/Time    Tissue Pathology Exam [[id number]] Collected:  04/02/18 1413    Specimen:  Tissue from Gallbladder " Updated:  04/05/18 1056     Case Report --     Surgical Pathology Report                         Case: [id number]                                  Authorizing Provider:  Marley Tamez MD       Collected:           04/02/2018 02:13 PM          Ordering Location:     [facility]   Received:            04/02/2018 03:16 PM                                 OR                                                                           Pathologist:           Fidel Haider MD                                                      Specimen:    Gallbladder, GALLBLADDER                                                                    Clinical Information --     GALLBLADDER       Final Diagnosis --     Testing performed at outside laboratory. See scanned report.         Embedded Images --    Magnesium [[id number]]  (Normal) Collected:  04/05/18 0404    Specimen:  Blood Updated:  04/05/18 1039     Magnesium 2.2 mg/dL     Body Fluid Culture - Bile, Gallbladder [[id number]]  (Abnormal)  (Susceptibility) Collected:  04/02/18 1331    Specimen:  Bile from Gallbladder Updated:  04/05/18 0812     BF Culture --      Scant growth (1+) Escherichia coli (A)     Gram Stain Result No organisms seen      Rare (1+) WBCs per low power field    Susceptibility      Escherichia coli     SON     Ampicillin <=2 ug/ml Susceptible     Ampicillin + Sulbactam <=2 ug/ml Susceptible     Cefazolin <=4 ug/ml Susceptible     Cefepime <=1 ug/ml Susceptible     Cefoxitin <=4 ug/ml Susceptible     Ceftriaxone <=1 ug/ml Susceptible     Ertapenem <=0.5 ug/ml Susceptible     Gentamicin <=1 ug/ml Susceptible     Levofloxacin <=0.12 ug/ml Susceptible     Meropenem <=0.25 ug/ml Susceptible     Piperacillin + Tazobactam <=4 ug/ml Susceptible     Trimethoprim + Sulfamethoxazole <=20 ug/ml Susceptible                    Comprehensive Metabolic Panel [[id number]]  (Abnormal) Collected:  04/05/18 0404    Specimen:  Blood Updated:  04/05/18 0519      Glucose 93 mg/dL      BUN 5 (L) mg/dL      Creatinine 0.64 (L) mg/dL      Sodium 138 mmol/L      Potassium 3.1 (L) mmol/L      Chloride 100 mmol/L      CO2 23.7 mmol/L      Calcium 8.7 (L) mg/dL      Total Protein 6.5 g/dL      Albumin 3.30 (L) g/dL      ALT (SGPT) 48 (H) U/L      AST (SGOT) 87 (H) U/L      Alkaline Phosphatase 320 (H) U/L      Total Bilirubin 1.4 (H) mg/dL      eGFR Non African Amer 126 mL/min/1.73      Globulin 3.2 gm/dL      A/G Ratio 1.0 g/dL      BUN/Creatinine Ratio 7.8     Anion Gap 14.3 mmol/L     CBC & Differential [[id number]] Collected:  04/05/18 0404    Specimen:  Blood Updated:  04/05/18 0457    Narrative:       The following orders were created for panel order CBC & Differential.  Procedure                               Abnormality         Status                     ---------                               -----------         ------                     Scan Slide[[id number]]                                                                  CBC Auto Differential[[id number]]        Abnormal            Final result                 Please view results for these tests on the individual orders.    CBC Auto Differential [[id number]]  (Abnormal) Collected:  04/05/18 0404    Specimen:  Blood Updated:  04/05/18 0457     WBC 10.95 (H) 10*3/mm3      RBC 3.35 (L) 10*6/mm3      Hemoglobin 11.8 (L) g/dL      Hematocrit 35.3 (L) %      .4 (H) fL      MCH 35.2 (H) pg      MCHC 33.4 g/dL      RDW 13.3 %      RDW-SD 51.8 fl      MPV 11.1 (H) fL      Platelets 293 10*3/mm3      Neutrophil % 74.4 (H) %      Lymphocyte % 14.1 (L) %      Monocyte % 8.6 (H) %      Eosinophil % 1.4 %      Basophil % 1.0 %      Immature Grans % 0.5 %      Neutrophils, Absolute 8.15 10*3/mm3      Lymphocytes, Absolute 1.54 10*3/mm3      Monocytes, Absolute 0.94 10*3/mm3      Eosinophils, Absolute 0.15 10*3/mm3      Basophils, Absolute 0.11 10*3/mm3      Immature Grans, Absolute 0.06 (H) 10*3/mm3      nRBC 0.0 /100 WBC            Imaging Results (last 24 hours)     ** No results found for the last 24 hours. **        ECG/EMG Results (most recent)     Procedure Component Value Units Date/Time    ECG 12 Lead [[id number]] Collected:  04/01/18 1855     Updated:  04/01/18 2000    Narrative:       RR Interval= 625 ms  VA Interval= 128 ms  QRSD Interval= 78 ms  QT Interval= 348 ms  QTc Interval= 440 ms  Heart Rate= 96 ms  P Axis= 43 deg  QRS Axis= -38 deg  T Wave Axis= 43 deg  I: 40 Axis= 52 deg  T: 40 Axis= -45 deg  ST Axis= 72 deg  SINUS RHYTHM  LEFT AXIS DEVIATION  NO SIGNIFICANT CHANGE FROM PREVIOUS ECG  Electronically Signed by:  Samson Nayak ([facility]) 01-Apr-2018 19:59:15  Date and Time of Study: 2018-04-01 18:55:21          Medication Review:   I have reviewed the patient's current medication list    Current Facility-Administered Medications:   •  acetaminophen (TYLENOL) tablet 650 mg, 650 mg, Oral, Q4H PRN, Mihaela Leslie MD  •  albuterol (PROVENTIL) nebulizer solution 0.083% 2.5 mg/3mL, 2.5 mg, Nebulization, Q6H PRN, Jaylin Matthew MD  •  amLODIPine (NORVASC) tablet 5 mg, 5 mg, Oral, Daily, Mihaela Leslie MD, 5 mg at 04/05/18 0801  •  bisacodyl (DULCOLAX) suppository 10 mg, 10 mg, Rectal, Daily PRN, Vin Bullard MD  •  budesonide-formoterol (SYMBICORT) 160-4.5 MCG/ACT inhaler 2 puff, 2 puff, Inhalation, BID, Mihaela Leslie MD, 2 puff at 04/05/18 0951  •  enoxaparin (LOVENOX) syringe 40 mg, 40 mg, Subcutaneous, Daily, Marley Tamez MD, 40 mg at 04/05/18 0801  •  folic acid (FOLVITE) tablet 800 mcg, 800 mcg, Oral, Daily, Jaylin Matthew MD, 800 mcg at 04/05/18 1036  •  HYDROcodone-acetaminophen (NORCO) 7.5-325 MG per tablet 1 tablet, 1 tablet, Oral, Q4H PRN, Niurka Iqbal, JOHN, 1 tablet at 04/05/18 0801  •  HYDROmorphone (DILAUDID) injection 1 mg, 1 mg, Intravenous, Q2H PRN, Mihaela Leslie MD, 1 mg at 04/05/18 0301  •  magnesium sulfate 4 gram infusion - Mg less than  or equal to 1mg/dL, 4 g, Intravenous, PRN **OR** magnesium sulfate 3 gram infusion (1gm x 3) - Mg 1.1 - 1.5 mg/dL, 1 g, Intravenous, PRN **OR** Magnesium Sulfate 2 gram infusion- Mg 1.6 - 1.9 mg/dL, 2 g, Intravenous, PRN, Jaylin Matthew MD  •  nicotine (NICODERM CQ) 21 MG/24HR patch 1 patch, 1 patch, Transdermal, Q24H, Mihaela Leslie MD, 1 patch at 04/04/18 2037  •  ondansetron (ZOFRAN) tablet 4 mg, 4 mg, Oral, Q6H PRN **OR** ondansetron ODT (ZOFRAN-ODT) disintegrating tablet 4 mg, 4 mg, Oral, Q6H PRN **OR** ondansetron (ZOFRAN) injection 4 mg, 4 mg, Intravenous, Q6H PRN, Mihaela Leslie MD  •  ondansetron (ZOFRAN) tablet 4 mg, 4 mg, Oral, Q6H PRN **OR** ondansetron ODT (ZOFRAN-ODT) disintegrating tablet 4 mg, 4 mg, Oral, Q6H PRN **OR** ondansetron (ZOFRAN) injection 4 mg, 4 mg, Intravenous, Q6H PRN, Marley Tamez MD  •  pantoprazole (PROTONIX) EC tablet 40 mg, 40 mg, Oral, Q AM, Niurka Iqbal APRN, 40 mg at 04/05/18 0531  •  piperacillin-tazobactam (ZOSYN) 4.5 g/100 mL 0.9% NS IVPB (mbp), 4.5 g, Intravenous, Q8H, Mihaela Leslie MD, Last Rate: 0 mL/hr at 04/04/18 2258, 4.5 g at 04/05/18 1036  •  polyethylene glycol 3350 powder (packet), 17 g, Oral, Daily, Niurka Iqbal, APRN, 17 g at 04/05/18 0801  •  potassium chloride (K-DUR,KLOR-CON) CR tablet 40 mEq, 40 mEq, Oral, PRN **OR** potassium chloride (KLOR-CON) packet 40 mEq, 40 mEq, Oral, PRN, 40 mEq at 04/05/18 1546 **OR** potassium chloride 10 mEq in 100 mL IVPB, 10 mEq, Intravenous, Q1H PRN, Jaylin Matthew MD  •  promethazine (PHENERGAN) injection 12.5 mg, 12.5 mg, Intravenous, Q6H PRN, 12.5 mg at 04/03/18 0307 **OR** promethazine (PHENERGAN) injection 12.5 mg, 12.5 mg, Intramuscular, Q6H PRN, Marley Tamez MD  •  saccharomyces boulardii (FLORASTOR) capsule 250 mg, 250 mg, Oral, BID, Niurka Iqbal, APRN, 250 mg at 04/05/18 1005  •  sennosides-docusate sodium (SENOKOT-S) 8.6-50 MG tablet 2 tablet, 2  tablet, Oral, Nightly PRN, Mihaela Leslie MD, 2 tablet at 04/04/18 1703  •  sodium chloride 0.9 % flush 1-10 mL, 1-10 mL, Intravenous, PRN, Mihaela Leslie MD  •  Insert peripheral IV, , , Once **AND** sodium chloride 0.9 % flush 10 mL, 10 mL, Intravenous, PRN, Adriana Gurrola PA-C  •  sodium chloride 0.9 % infusion 40 mL, 40 mL, Intravenous, PRN, Mihaela Leslie MD      Assessment/Plan     1. Acute cholecystitis: POD #3 from Laparoscopic converted to open cholecystectomy (done by Dr. Tamez).  Patient's bili slightly up today. H/o elevated LFTs that has been W/U by Dr. Johnson's in the past (see #2 below). Patient WBC near NL and he is afebrile. Will change IV zosyn to po Augmentin and continue probiotic. Patient grew e-coli from abdominal culture. Pain controlled with mostly po narcotics. Surgery managing and diet advanced to regular by Dr. Bullard today.  Will D/C IVFs. Drain still in place, likely home tomorrow.      2. Hepatic cirrhosis, elevated AST, Alk phos and Bili: Cirrhosis visualized by Dr. Tamez during surgery (I suspect secondary to the patient's years of EtOH abuse). Recent hepatitis panel was NL. All LFTs slightly worse today, will recheck CMP in AM.  I would recommend patient F/U with hepatology outpatient.     3. HTN: BP is intermittently elevated on home dose Norvasc. Llikely secondary to pain here. Monitor.       4. COPD: Patient on home Symbicort. No acute issues here.      5. GERD: On PPI, no acute issues.       6. H/O constipation and cold intolerance: TSH is WNL. On miralax. F/U with PCP.     7. H/O Heavy EtOH use: Patient without alcohol now for 4-6 weeks prior to admission so no concerns for withdrawal here.      8. Tobacco Abuse: Nicotine patch, cessation education.     9. Anemia and folate deficiency: Added folate supplementation here. F/U with PCP outpatient. B12 WNL. Monitor. Hb now rising.     10. Hypokalemia: Will start replacement protocol. Mag is WNL. Recheck in AM.      Plan for disposition: Home likely tomorrow.    Jaylin Matthew MD  04/05/18  4:20 PM

## 2018-04-05 NOTE — PLAN OF CARE
Problem: Patient Care Overview  Goal: Plan of Care Review  Outcome: Ongoing (interventions implemented as appropriate)   04/05/18 1529   Coping/Psychosocial   Plan of Care Reviewed With patient;spouse   Plan of Care Review   Progress improving   OTHER   Outcome Summary VSS. Pt. up ambulating ad nabila multiple times throughout shift. Pain well controlled with PRN Norco. Advanced to regular diet, tolerating well. Cont. IV fluids d/c. Pt. continues on IV Zosyn. Surgical site C/D and staples intact. Plan to d/c tomorrow.        Problem: Pain, Acute (Adult)  Goal: Acceptable Pain Control/Comfort Level  Outcome: Ongoing (interventions implemented as appropriate)   04/05/18 1529   Pain, Acute (Adult)   Acceptable Pain Control/Comfort Level making progress toward outcome       Problem: Surgery Nonspecified (Adult)  Goal: Signs and Symptoms of Listed Potential Problems Will be Absent, Minimized or Managed (Surgery Nonspecified)  Outcome: Ongoing (interventions implemented as appropriate)   04/05/18 1529   Goal/Outcome Evaluation   Problems Assessed (Surgery) all   Problems Present (Surgery) pain;situational response

## 2018-04-05 NOTE — PLAN OF CARE
Problem: Patient Care Overview  Goal: Plan of Care Review   04/04/18 2050   Coping/Psychosocial   Plan of Care Reviewed With patient   Plan of Care Review   Progress improving     Goal: Individualization and Mutuality  Outcome: Ongoing (interventions implemented as appropriate)

## 2018-04-06 VITALS
TEMPERATURE: 98 F | WEIGHT: 128.09 LBS | RESPIRATION RATE: 18 BRPM | DIASTOLIC BLOOD PRESSURE: 70 MMHG | BODY MASS INDEX: 20.59 KG/M2 | SYSTOLIC BLOOD PRESSURE: 130 MMHG | HEART RATE: 68 BPM | HEIGHT: 66 IN | OXYGEN SATURATION: 95 %

## 2018-04-06 LAB
ALBUMIN SERPL-MCNC: 3 G/DL (ref 3.5–5.2)
ALBUMIN/GLOB SERPL: 0.9 G/DL
ALP SERPL-CCNC: 273 U/L (ref 40–129)
ALT SERPL W P-5'-P-CCNC: 36 U/L (ref 5–41)
ANION GAP SERPL CALCULATED.3IONS-SCNC: 11.3 MMOL/L
AST SERPL-CCNC: 48 U/L (ref 5–40)
BILIRUB SERPL-MCNC: 0.8 MG/DL (ref 0.2–1.2)
BUN BLD-MCNC: 5 MG/DL (ref 8–23)
BUN/CREAT SERPL: 7.8 (ref 7–25)
CALCIUM SPEC-SCNC: 8.8 MG/DL (ref 8.8–10.5)
CHLORIDE SERPL-SCNC: 101 MMOL/L (ref 98–107)
CO2 SERPL-SCNC: 25.7 MMOL/L (ref 22–29)
CREAT BLD-MCNC: 0.64 MG/DL (ref 0.76–1.27)
GFR SERPL CREATININE-BSD FRML MDRD: 126 ML/MIN/1.73
GLOBULIN UR ELPH-MCNC: 3.5 GM/DL
GLUCOSE BLD-MCNC: 98 MG/DL (ref 65–99)
POTASSIUM BLD-SCNC: 4 MMOL/L (ref 3.5–5.2)
PROT SERPL-MCNC: 6.5 G/DL (ref 6–8.5)
SODIUM BLD-SCNC: 138 MMOL/L (ref 136–145)

## 2018-04-06 PROCEDURE — 80053 COMPREHEN METABOLIC PANEL: CPT | Performed by: HOSPITALIST

## 2018-04-06 PROCEDURE — 99239 HOSP IP/OBS DSCHRG MGMT >30: CPT | Performed by: HOSPITALIST

## 2018-04-06 PROCEDURE — 94799 UNLISTED PULMONARY SVC/PX: CPT

## 2018-04-06 RX ORDER — OXYCODONE HYDROCHLORIDE AND ACETAMINOPHEN 5; 325 MG/1; MG/1
1 TABLET ORAL EVERY 4 HOURS PRN
Start: 2018-04-06 | End: 2018-04-27 | Stop reason: SDUPTHER

## 2018-04-06 RX ORDER — AMOXICILLIN AND CLAVULANATE POTASSIUM 875; 125 MG/1; MG/1
1 TABLET, FILM COATED ORAL EVERY 12 HOURS SCHEDULED
Qty: 11 TABLET | Refills: 0 | Status: SHIPPED | OUTPATIENT
Start: 2018-04-06 | End: 2018-04-12

## 2018-04-06 RX ORDER — SACCHAROMYCES BOULARDII 250 MG
250 CAPSULE ORAL 2 TIMES DAILY
Qty: 60 CAPSULE | Refills: 0 | Status: SHIPPED | OUTPATIENT
Start: 2018-04-06

## 2018-04-06 RX ORDER — NICOTINE 21 MG/24HR
1 PATCH, TRANSDERMAL 24 HOURS TRANSDERMAL EVERY 24 HOURS
Qty: 21 PATCH | Refills: 0 | Status: SHIPPED | OUTPATIENT
Start: 2018-04-06

## 2018-04-06 RX ORDER — UREA 10 %
800 LOTION (ML) TOPICAL DAILY
Qty: 30 TABLET | Refills: 0 | Status: SHIPPED | OUTPATIENT
Start: 2018-04-07

## 2018-04-06 RX ORDER — HYDROCODONE BITARTRATE AND ACETAMINOPHEN 5; 325 MG/1; MG/1
1 TABLET ORAL EVERY 4 HOURS PRN
Start: 2018-04-06 | End: 2018-04-06 | Stop reason: HOSPADM

## 2018-04-06 RX ORDER — HYDROCODONE BITARTRATE AND ACETAMINOPHEN 7.5; 325 MG/1; MG/1
1 TABLET ORAL EVERY 4 HOURS PRN
Start: 2018-04-06 | End: 2018-04-06 | Stop reason: HOSPADM

## 2018-04-06 RX ADMIN — AMOXICILLIN AND CLAVULANATE POTASSIUM 1 TABLET: 875; 125 TABLET, FILM COATED ORAL at 10:44

## 2018-04-06 RX ADMIN — BUDESONIDE AND FORMOTEROL FUMARATE DIHYDRATE 2 PUFF: 160; 4.5 AEROSOL RESPIRATORY (INHALATION) at 08:28

## 2018-04-06 RX ADMIN — Medication 250 MG: at 08:41

## 2018-04-06 RX ADMIN — HYDROCODONE BITARTRATE AND ACETAMINOPHEN 1 TABLET: 7.5; 325 TABLET ORAL at 03:56

## 2018-04-06 RX ADMIN — PANTOPRAZOLE SODIUM 40 MG: 40 TABLET, DELAYED RELEASE ORAL at 06:15

## 2018-04-06 RX ADMIN — ACETAMINOPHEN 800 MCG: 400 TABLET ORAL at 08:41

## 2018-04-06 RX ADMIN — AMLODIPINE BESYLATE 5 MG: 5 TABLET ORAL at 08:41

## 2018-04-06 NOTE — NURSING NOTE
Case Management Discharge Note    Final Note: dc home to self care    Destination     No service coordination in this encounter.      Durable Medical Equipment     No service coordination in this encounter.      Dialysis/Infusion     No service coordination in this encounter.      Home Medical Care     No service coordination in this encounter.      Social Care     No service coordination in this encounter.        Other:  (private vehicle)    Final Discharge Disposition Code: 01 - home or self-care

## 2018-04-06 NOTE — PROGRESS NOTES
[facility]/GI Progress Note   Chief Complaint:  Cholecystitis    Subjective      She's doing well.  Still has some mild incisional pain.  SANAZ drainage is minimal 10 cc of serosanguineous.  He is tolerating a regular diet.  Bilirubin is normal today.    Objective     Vital Signs  Temp:  [97.6 °F (36.4 °C)-98.5 °F (36.9 °C)] 97.6 °F (36.4 °C)  Heart Rate:  [74-90] 74  Resp:  [18-20] 18  BP: (126-146)/(75-86) 139/76  Body mass index is 20.83 kg/m².    Intake/Output Summary (Last 24 hours) at 04/06/18 0844  Last data filed at 04/06/18 0557   Gross per 24 hour   Intake              480 ml   Output              585 ml   Net             -105 ml     No intake/output data recorded.       Physical Exam:   General: patient awake, alert and cooperative   Eyes: Normal lids and lashes, no scleral icterus   Neck: supple, normal ROM   Skin: warm and dry, not jaundiced   Abdomen: soft, nontender, nondistended; normal bowel sounds                                 Incision healing well no cellulitis or drainage.                                 SANAZ drain removed.   Rectal: deferred   Extremities: no rash or edema   Neurologic: Normal mood and behavior     Results Review:     I reviewed the patient's new clinical results.      WBC No results found for: WBCS   HGB Hemoglobin   Date Value Ref Range Status   04/05/2018 11.8 (L) 14.0 - 18.0 g/dL Final   04/04/2018 11.0 (L) 14.0 - 18.0 g/dL Final      HCT Hematocrit   Date Value Ref Range Status   04/05/2018 35.3 (L) 42.0 - 52.0 % Final   04/04/2018 34.0 (L) 42.0 - 52.0 % Final      Platlets No results found for: LABPLAT     PT/INR:  No results found for: PROTIME/No results found for: INR    Sodium Sodium   Date Value Ref Range Status   04/06/2018 138 136 - 145 mmol/L Final   04/05/2018 138 136 - 145 mmol/L Final   04/04/2018 140 136 - 145 mmol/L Final      Potassium Potassium   Date Value Ref Range Status   04/06/2018 4.0 3.5 - 5.2 mmol/L Final   04/05/2018 3.1 (L) 3.5 - 5.2 mmol/L Final    04/04/2018 3.5 3.5 - 5.2 mmol/L Final      Chloride Chloride   Date Value Ref Range Status   04/06/2018 101 98 - 107 mmol/L Final   04/05/2018 100 98 - 107 mmol/L Final   04/04/2018 103 98 - 107 mmol/L Final      Bicarbonate No results found for: PLASMABICARB   BUN BUN   Date Value Ref Range Status   04/06/2018 5 (L) 8 - 23 mg/dL Final   04/05/2018 5 (L) 8 - 23 mg/dL Final   04/04/2018 7 (L) 8 - 23 mg/dL Final      Creatinine Creatinine   Date Value Ref Range Status   04/06/2018 0.64 (L) 0.76 - 1.27 mg/dL Final   04/05/2018 0.64 (L) 0.76 - 1.27 mg/dL Final   04/04/2018 0.81 0.76 - 1.27 mg/dL Final      Calcium Calcium   Date Value Ref Range Status   04/06/2018 8.8 8.8 - 10.5 mg/dL Final   04/05/2018 8.7 (L) 8.8 - 10.5 mg/dL Final   04/04/2018 8.4 (L) 8.8 - 10.5 mg/dL Final      Magnesium  AST  ALT  Bilirubin, Total  AlkPhos  Albumin    Amylase  Lipase    Radiology: Magnesium   Date Value Ref Range Status   04/05/2018 2.2 1.7 - 2.5 mg/dL Final     No components found for: AST.*  No components found for: ALT.*  No components found for: BILIRUBIN, TOTAL.*    No components found for: ALKPHOS.*  No components found for: ALBUMIN.*      No components found for: AMYLASE.*  No components found for: LIPASE.*            Imaging Results (most recent)     Procedure Component Value Units Date/Time    US Abdomen Complete [[id number]] Collected:  04/02/18 1000     Updated:  04/02/18 1009    Narrative:       INDICATION: Abdominal pain for 2 days.     EXAM: Abdominal ultrasound, complete.     COMPARISON: CT abdomen 3/31/2018     FINDINGS:  Pancreas is normal. Liver parenchyma is homogeneous with no focal mass.  There is a tiny cyst in the left hepatic lobe. Gallbladder is distended  with a mildly thickened wall and a trace amount of pericholecystic  fluid. Gallbladder sludge is noted. There may be some adenomyomatosis in  the gallbladder wall. Common duct is prominent for size and 7 mm. No  stone is seen within the duct. IVC is  patent by Doppler. Abdominal aorta  is normal in caliber. Spleen size is normal with a syyw-pi-thna length  of 11.4 cm. Both kidneys are morphologically normal and nonobstructed.       Impression:          1. Distended gallbladder with mildly thickened wall and some  pericholecystic fluid. There is also gallbladder sludge and probably  adenomyomatosis of the gallbladder wall. Common duct is prominent as  well at 7 mm.  2. The remainder of the abdominal ultrasound is negative.     A preliminary report was provided by Dr. Hinds at 1115 hours on 4/1/2018.     This report was finalized on 4/2/2018 10:07 AM by Dr. Jatin Pollock MD.       NM Hepatobiliary Without CCK [[id number]] Collected:  04/02/18 0955     Updated:  04/02/18 1000    Narrative:       Hepatobiliary scan     INDICATION: Right upper quadrant pain with fever and elevated white  blood cell count. Abnormal CT 3/31/2018 showing cystic duct stone.     FINDINGS: Imaging was obtained of the abdomen for 2 hours after the IV  administration of 5.7 mCi of technetium 99m-Choletec. COMPARISON made  with CT abdomen 3/31/2018.     There is prompt uptake by the liver with prompt biliary and small bowel  activity seen within 15 minutes. Gallbladder is not seen on the  examination. This is concerning for cystic duct obstruction and acute  cholecystitis.       Impression:       Nonvisualization of the gallbladder at 2 hours suspicious  for acute cholecystitis. No biliary obstruction.     A preliminary report was provided by Dr. Collier at 3:50 PM on 4/1/2018.     This report was finalized on 4/2/2018 9:58 AM by Dr. Jatin Pollock MD.       XR Chest PA & Lateral [[id number]] Collected:  04/02/18 0834     Updated:  04/02/18 0837    Narrative:       Chest x-ray     INDICATION: Preoperative exam for cholecystectomy. COPD. Reflux disease.     FINDINGS: PA and lateral views of the chest were obtained. No  comparison. Lungs are clear. Cardiac and mediastinal contours are  normal.  No pneumothorax.       Impression:       No active disease.     A preliminary report was provided by Dr. Collier at 1540 hours on  4/1/2018.     This report was finalized on 4/2/2018 8:35 AM by Dr. Jatin Pollock MD.       CT Abdomen Pelvis With Contrast [[id number]] Collected:  04/01/18 0736     Updated:  04/01/18 0741    Narrative:       CT ABDOMEN AND PELVIS WITH INTRAVENOUS CONTRAST     HISTORY:  Diffuse abdominal pain worsening over the past day. Colonoscopy  yesterday.     TECHNIQUE:  Axial images were obtained with intravenous contrast. 100 cc of Isovue  was used. COMPARISON from 2/16/2018. 1 previous CT and no nuclear  cardiac studies over the past year. Radiation dose reduction techniques  were utilized, including automated exposure control and exposure  modulation based on body size.     ABDOMEN FINDINGS:  The lung bases are clear. There is no evidence of free air in the  peritoneal cavity. Fatty infiltration of the liver is noted. Shows  improvement since the scan of 2/16/2018. A small hepatic cyst is  unchanged. The spleen kidneys adrenals and pancreas have a normal  appearance. The gallbladder is distended and there is a small  pericholecystic fluid collection that was not seen on the previous exam.  There is a small partially calcified gallstone in the gallbladder neck  or cystic duct. The common bile duct is nondilated. FINDINGS worrisome  for early cholecystitis. There is no evidence of retroperitoneal  adenopathy or ascites and no distended bowel loops are seen.     PELVIS FINDINGS:  In the pelvis there is no evidence of adenopathy mass or fluid  collection. Normal appendix.       Impression:       Small cystic duct stone with pericholecystic fluid  suspicious for early cholecystitis. Improvement in hepatic steatosis.  Normal appendix. No evidence of free air in the peritoneal cavity.     This report was finalized on 4/1/2018 7:39 AM by Dr. Jatin Braga MD.                                                Assessment/Plan     Patient Active Problem List   Diagnosis Code   • Encounter for screening for malignant neoplasm of colon Z12.11   • Elevated LFTs R79.89   • Abdominal pain R10.9   • Acute cholecystitis K81.0   • Right upper quadrant abdominal pain R10.11       He's doing well.  Tolerating his diet.  Juanpablo-Jane drain is been removed.  Is okay for discharge.  Follow-up with Dr. Apodaca next week for staple removal.  Call for problems.  He has been instructed to activities, limitations and wound care.        Vin Bullard MD  04/06/18  8:42 AM

## 2018-04-06 NOTE — PLAN OF CARE
Problem: Patient Care Overview  Goal: Individualization and Mutuality  Outcome: Ongoing (interventions implemented as appropriate)      Problem: Pain, Acute (Adult)  Goal: Acceptable Pain Control/Comfort Level  Outcome: Ongoing (interventions implemented as appropriate)

## 2018-04-06 NOTE — DISCHARGE SUMMARY
Babak Herrera  1955  [id number]        Hospitalists Discharge Summary    Date of Admission: 3/31/2018  Date of Discharge:  4/6/2018    Primary Discharge Diagnoses:   1. Acute cholecystitis s/p Laparoscopic converted to open cholecystectomy     Secondary Discharge Diagnoses:   2. Hepatic cirrhosis, elevated AST, Alk phos and Bili   3. HTN    4. COPD   5. GERD    6. H/O constipation and cold intolerance   7. H/O Heavy EtOH use   8. Tobacco Abuse   9. Anemia and folate deficiency   10. Hypokalemia    PCP  Patient Care Team:  No Known Provider as PCP - General    Consults:   Consults     Date and Time Order Name Status Description    4/1/2018 0152 Inpatient General Surgery Consult Completed           Operations and Procedures Performed:  Procedure(s):  CHOLECYSTECTOMY LAPAROSCOPIC converted to open cholecystectomy  CHOLECYSTECTOMY     Mri Lumbar Spine Without Contrast    Result Date: 3/15/2018  Narrative: MRI LUMBAR SPINE WITHOUT CONTRAST  INDICATION: Lower back pain with bilateral lower extremity radiculopathy for the past year  PROCEDURE: Sagittal and axial T1 and T2-weighted imaging of the lumbar spine  COMPARISON: None  FINDINGS:  Lumbar bodies have normal height. 3 mm anterolisthesis L3 on L4. Otherwise, alignment is preserved. Mild component of congenital central canal narrowing. Conus terminates at T12-L1 and has normal caliber and signal intensity.  L1-L2: Very mild broad-based posterior disc bulge. No significant central canal narrowing. Mild bilateral neural foraminal narrowing.  L2-L3: Mild facet arthrosis. Mild broad-based posterior disc bulge. Moderate bilateral neural foraminal narrowing related to facet change and foraminal disc osteophytes.  L3-L4: Mild anterolisthesis as detailed above. Moderate to moderately severe bilateral facet arthrosis. Broad-based posterior disc osteophyte. Moderately severe to severe central canal narrowing. Moderate bilateral neural foraminal narrowing.  L4-L5: Moderate  facet arthrosis. Broad-based posterior disc osteophyte. Moderately severe central canal narrowing. Moderate to moderately severe bilateral neural foraminal narrowing.  L5-S1: Transitional vertebral body with sacralization of L5. Small remainder the disc at L5-S1. No significant central canal narrowing. No significant neural foraminal      Impression: 3 mm anterolisthesis of L3 on L4. Transitional vertebral body with sacralization of L5.  Multilevel degenerative disc disease and facet arthrosis. Central canal narrowing is most significant at L3-L4.  Neural foraminal narrowing is most significant bilaterally at L4-L5.  This report was finalized on 3/15/2018 9:44 AM by Dr. Chirag Collier MD.      Nm Hepatobiliary Without Cck    Result Date: 4/2/2018  Narrative: Hepatobiliary scan  INDICATION: Right upper quadrant pain with fever and elevated white blood cell count. Abnormal CT 3/31/2018 showing cystic duct stone.  FINDINGS: Imaging was obtained of the abdomen for 2 hours after the IV administration of 5.7 mCi of technetium 99m-Choletec. COMPARISON made with CT abdomen 3/31/2018.  There is prompt uptake by the liver with prompt biliary and small bowel activity seen within 15 minutes. Gallbladder is not seen on the examination. This is concerning for cystic duct obstruction and acute cholecystitis.      Impression: Nonvisualization of the gallbladder at 2 hours suspicious for acute cholecystitis. No biliary obstruction.  A preliminary report was provided by Dr. Collier at 3:50 PM on 4/1/2018.  This report was finalized on 4/2/2018 9:58 AM by Dr. Jatin Pollock MD.      Us Abdomen Complete    Result Date: 4/2/2018  Narrative: INDICATION: Abdominal pain for 2 days.  EXAM: Abdominal ultrasound, complete.  COMPARISON: CT abdomen 3/31/2018  FINDINGS: Pancreas is normal. Liver parenchyma is homogeneous with no focal mass. There is a tiny cyst in the left hepatic lobe. Gallbladder is distended with a mildly thickened wall and a  trace amount of pericholecystic fluid. Gallbladder sludge is noted. There may be some adenomyomatosis in the gallbladder wall. Common duct is prominent for size and 7 mm. No stone is seen within the duct. IVC is patent by Doppler. Abdominal aorta is normal in caliber. Spleen size is normal with a wjor-kd-fhrv length of 11.4 cm. Both kidneys are morphologically normal and nonobstructed.      Impression:  1. Distended gallbladder with mildly thickened wall and some pericholecystic fluid. There is also gallbladder sludge and probably adenomyomatosis of the gallbladder wall. Common duct is prominent as well at 7 mm. 2. The remainder of the abdominal ultrasound is negative.  A preliminary report was provided by Dr. Hinds at 1115 hours on 4/1/2018.  This report was finalized on 4/2/2018 10:07 AM by Dr. Jatin Pollock MD.      Ct Abdomen Pelvis With Contrast    Result Date: 4/1/2018  Narrative: CT ABDOMEN AND PELVIS WITH INTRAVENOUS CONTRAST  HISTORY: Diffuse abdominal pain worsening over the past day. Colonoscopy yesterday.  TECHNIQUE: Axial images were obtained with intravenous contrast. 100 cc of Isovue was used. COMPARISON from 2/16/2018. 1 previous CT and no nuclear cardiac studies over the past year. Radiation dose reduction techniques were utilized, including automated exposure control and exposure modulation based on body size.  ABDOMEN FINDINGS: The lung bases are clear. There is no evidence of free air in the peritoneal cavity. Fatty infiltration of the liver is noted. Shows improvement since the scan of 2/16/2018. A small hepatic cyst is unchanged. The spleen kidneys adrenals and pancreas have a normal appearance. The gallbladder is distended and there is a small pericholecystic fluid collection that was not seen on the previous exam. There is a small partially calcified gallstone in the gallbladder neck or cystic duct. The common bile duct is nondilated. FINDINGS worrisome for early cholecystitis. There is no  evidence of retroperitoneal adenopathy or ascites and no distended bowel loops are seen.  PELVIS FINDINGS: In the pelvis there is no evidence of adenopathy mass or fluid collection. Normal appendix.      Impression: Small cystic duct stone with pericholecystic fluid suspicious for early cholecystitis. Improvement in hepatic steatosis. Normal appendix. No evidence of free air in the peritoneal cavity.  This report was finalized on 4/1/2018 7:39 AM by Dr. Jatin Braga MD.      Xr Chest Pa & Lateral    Result Date: 4/2/2018  Narrative: Chest x-ray  INDICATION: Preoperative exam for cholecystectomy. COPD. Reflux disease.  FINDINGS: PA and lateral views of the chest were obtained. No comparison. Lungs are clear. Cardiac and mediastinal contours are normal. No pneumothorax.      Impression: No active disease.  A preliminary report was provided by Dr. Collier at 1540 hours on 4/1/2018.  This report was finalized on 4/2/2018 8:35 AM by Dr. Jatin Pollock MD.        Allergies:  is allergic to erythromycin.      Discharge Medications:   Babak Herrera   Home Medication Instructions JUSTICE:[id number]    Printed on:04/06/18 4794   Medication Information                      amLODIPine (NORVASC) 5 MG tablet  Take 5 mg by mouth Daily.             amoxicillin-clavulanate (AUGMENTIN) 875-125 MG per tablet  Take 1 tablet by mouth Every 12 (Twelve) Hours for 11 doses.             aspirin 81 MG EC tablet  Take 81 mg by mouth Daily.             budesonide-formoterol (SYMBICORT) 160-4.5 MCG/ACT inhaler  Inhale 2 puffs 2 (Two) Times a Day As Needed.             esomeprazole (nexIUM) 20 MG capsule  Take 20 mg by mouth Every Morning Before Breakfast.             folic acid (FOLVITE) 800 MCG tablet  Take 1 tablet by mouth Daily.             nicotine (NICODERM CQ) 21 MG/24HR patch  Place 1 patch on the skin Daily. If patient desires to continue smoking cessation.             oxyCODONE-acetaminophen (PERCOCET) 5-325 MG per tablet  Take 1  tablet by mouth Every 4 (Four) Hours As Needed for Moderate Pain .             polyethylene glycol (MIRALAX) pack packet  Take 17 g by mouth Daily. Over the counter             saccharomyces boulardii (FLORASTOR) 250 MG capsule  Take 1 capsule by mouth 2 (Two) Times a Day.                 History of Present Illness: (from H&P)  61 yo WM w/ PMH of COPD, HTN, Etoh Abuse, who presented w/ Epigastric and ruq pain that has been waxing and waning since pt ate pan fried burgers, and deep fried french fries after being NPO, and not eating for 31 hours eugenio-procedurally for his colonoscopy w/ Dr. Stewart on 3/30.   At worst pain is 10/10, best 6-7 out of 10. Attributes it to holding his nexium for the colonscopy. But admits that this pain has occurred on several other occasions, but not as severe. Does not recall if associated with fatty foods in past. Other associated symptoms have been n/v, and anorexia.     Pt stopped drinking about 4 weeks ago.      ROS: positive for Cold intolerance, constipation, plantar fascitis, alcoholic peripheral neuropathy, insomnia     Hospital Course  1. Acute cholecystitis: POD #4 from Laparoscopic converted to open cholecystectomy (done by Dr. Tamez).  Patient's bili NL today. H/o elevated LFTs that has been W/U by Dr. Johnson's in the past (see #2 below). Patient WBC near NL last checked and he is afebrile. Was on IV zosyn now on po Augmentin to complete course and continue probiotic. Patient grew e-coli from abdominal culture. Pain controlled. Surgery managing and patient tolerating a regular diet. OK with surgery for patient to go home today. SANAZ drain removed this AM. Activity restrictions per surgery. Patient to F/U with Dr. Tamez in 1 week for staple removal.     2. Hepatic cirrhosis, elevated AST, Alk phos and Bili: Cirrhosis visualized by Dr. Tamez during surgery (I suspect secondary to the patient's years of EtOH abuse). Recent hepatitis panel was NL. LFTs fluctuating here.  I would  recommend patient F/U with Dr. Stewart as outpatient and possible referral to hepatology if she feels appropriate.     3. HTN: BP is intermittently elevated on home dose Norvasc. Llikely secondary to pain here. F/U with PCP outpatient for further monitoring.      4. COPD: Patient on home Symbicort. No acute issues here.      5. GERD: On PPI, no acute issues.       6. H/O constipation and cold intolerance: TSH is WNL. On miralax. F/U with PCP.     7. H/O Heavy EtOH use: Patient without alcohol now for 4-6 weeks prior to admission so no concerns for withdrawal here.      8. Tobacco Abuse: Nicotine patch, cessation education.     9. Anemia and folate deficiency: Added folate supplementation here. F/U with PCP outpatient. B12 WNL. Monitor. Hb now rising.      10. Hypokalemia: Replaced po and today resolved. Mag is WNL. Patient now tolerating good po.        Last Lab Results:   Lab Results (most recent)     Procedure Component Value Units Date/Time    Comprehensive Metabolic Panel [[id number]]  (Abnormal) Collected:  04/06/18 0429    Specimen:  Blood Updated:  04/06/18 0519     Glucose 98 mg/dL      BUN 5 (L) mg/dL      Creatinine 0.64 (L) mg/dL      Sodium 138 mmol/L      Potassium 4.0 mmol/L      Chloride 101 mmol/L      CO2 25.7 mmol/L      Calcium 8.8 mg/dL      Total Protein 6.5 g/dL      Albumin 3.00 (L) g/dL      ALT (SGPT) 36 U/L      AST (SGOT) 48 (H) U/L      Alkaline Phosphatase 273 (H) U/L      Total Bilirubin 0.8 mg/dL      eGFR Non African Amer 126 mL/min/1.73      Globulin 3.5 gm/dL      A/G Ratio 0.9 g/dL      BUN/Creatinine Ratio 7.8     Anion Gap 11.3 mmol/L     Tissue Pathology Exam [[id number]] Collected:  04/02/18 1413    Specimen:  Tissue from Gallbladder Updated:  04/05/18 1056     Case Report --     Surgical Pathology Report                         Case: [id number]                                  Authorizing Provider:  Marley Tamez MD       Collected:           04/02/2018 02:13 PM           Ordering Location:     [facility]   Received:            04/02/2018 03:16 PM                                 OR                                                                           Pathologist:           Fidel Haider MD                                                      Specimen:    Gallbladder, GALLBLADDER                                                                    Clinical Information --     GALLBLADDER       Final Diagnosis --     Testing performed at outside laboratory. See scanned report.         Embedded Images --    Magnesium [[id number]]  (Normal) Collected:  04/05/18 0404    Specimen:  Blood Updated:  04/05/18 1039     Magnesium 2.2 mg/dL     Body Fluid Culture - Bile, Gallbladder [[id number]]  (Abnormal)  (Susceptibility) Collected:  04/02/18 1331    Specimen:  Bile from Gallbladder Updated:  04/05/18 0812     BF Culture --      Scant growth (1+) Escherichia coli (A)     Gram Stain Result No organisms seen      Rare (1+) WBCs per low power field    Susceptibility      Escherichia coli     SON     Ampicillin <=2 ug/ml Susceptible     Ampicillin + Sulbactam <=2 ug/ml Susceptible     Cefazolin <=4 ug/ml Susceptible     Cefepime <=1 ug/ml Susceptible     Cefoxitin <=4 ug/ml Susceptible     Ceftriaxone <=1 ug/ml Susceptible     Ertapenem <=0.5 ug/ml Susceptible     Gentamicin <=1 ug/ml Susceptible     Levofloxacin <=0.12 ug/ml Susceptible     Meropenem <=0.25 ug/ml Susceptible     Piperacillin + Tazobactam <=4 ug/ml Susceptible     Trimethoprim + Sulfamethoxazole <=20 ug/ml Susceptible                    Comprehensive Metabolic Panel [[id number]]  (Abnormal) Collected:  04/05/18 0404    Specimen:  Blood Updated:  04/05/18 0519     Glucose 93 mg/dL      BUN 5 (L) mg/dL      Creatinine 0.64 (L) mg/dL      Sodium 138 mmol/L      Potassium 3.1 (L) mmol/L      Chloride 100 mmol/L      CO2 23.7 mmol/L      Calcium 8.7 (L) mg/dL      Total Protein 6.5 g/dL      Albumin 3.30 (L) g/dL       ALT (SGPT) 48 (H) U/L      AST (SGOT) 87 (H) U/L      Alkaline Phosphatase 320 (H) U/L      Total Bilirubin 1.4 (H) mg/dL      eGFR Non African Amer 126 mL/min/1.73      Globulin 3.2 gm/dL      A/G Ratio 1.0 g/dL      BUN/Creatinine Ratio 7.8     Anion Gap 14.3 mmol/L     CBC & Differential [[id number]] Collected:  04/05/18 0404    Specimen:  Blood Updated:  04/05/18 0457    Narrative:       The following orders were created for panel order CBC & Differential.  Procedure                               Abnormality         Status                     ---------                               -----------         ------                     Scan Slide[[id number]]                                                                  CBC Auto Differential[[id number]]        Abnormal            Final result                 Please view results for these tests on the individual orders.    CBC Auto Differential [[id number]]  (Abnormal) Collected:  04/05/18 0404    Specimen:  Blood Updated:  04/05/18 0457     WBC 10.95 (H) 10*3/mm3      RBC 3.35 (L) 10*6/mm3      Hemoglobin 11.8 (L) g/dL      Hematocrit 35.3 (L) %      .4 (H) fL      MCH 35.2 (H) pg      MCHC 33.4 g/dL      RDW 13.3 %      RDW-SD 51.8 fl      MPV 11.1 (H) fL      Platelets 293 10*3/mm3      Neutrophil % 74.4 (H) %      Lymphocyte % 14.1 (L) %      Monocyte % 8.6 (H) %      Eosinophil % 1.4 %      Basophil % 1.0 %      Immature Grans % 0.5 %      Neutrophils, Absolute 8.15 10*3/mm3      Lymphocytes, Absolute 1.54 10*3/mm3      Monocytes, Absolute 0.94 10*3/mm3      Eosinophils, Absolute 0.15 10*3/mm3      Basophils, Absolute 0.11 10*3/mm3      Immature Grans, Absolute 0.06 (H) 10*3/mm3      nRBC 0.0 /100 WBC     CBC & Differential [[id number]] Collected:  04/04/18 0422    Specimen:  Blood Updated:  04/04/18 0716    Narrative:       The following orders were created for panel order CBC & Differential.  Procedure                               Abnormality          Status                     ---------                               -----------         ------                     Scan Slide[[id number]]                                                                  CBC Auto Differential[[id number]]        Abnormal            Final result                 Please view results for these tests on the individual orders.    CBC Auto Differential [[id number]]  (Abnormal) Collected:  04/04/18 0422    Specimen:  Blood Updated:  04/04/18 0711     WBC 11.57 (H) 10*3/mm3      RBC 3.13 (L) 10*6/mm3      Hemoglobin 11.0 (L) g/dL      Hematocrit 34.0 (L) %      .6 (H) fL      MCH 35.1 (H) pg      MCHC 32.4 g/dL      RDW 13.5 %      RDW-SD 54.4 (H) fl      MPV 11.1 (H) fL      Platelets 224 10*3/mm3      Neutrophil % 75.8 (H) %      Lymphocyte % 15.1 (L) %      Monocyte % 7.4 %      Eosinophil % 0.5 %      Basophil % 0.8 %      Immature Grans % 0.4 %      Neutrophils, Absolute 8.76 (H) 10*3/mm3      Lymphocytes, Absolute 1.75 10*3/mm3      Monocytes, Absolute 0.86 10*3/mm3      Eosinophils, Absolute 0.06 (L) 10*3/mm3      Basophils, Absolute 0.09 10*3/mm3      Immature Grans, Absolute 0.05 (H) 10*3/mm3      nRBC 0.0 /100 WBC     Comprehensive Metabolic Panel [[id number]]  (Abnormal) Collected:  04/04/18 0422    Specimen:  Blood Updated:  04/04/18 0538     Glucose 106 (H) mg/dL      BUN 7 (L) mg/dL      Creatinine 0.81 mg/dL      Sodium 140 mmol/L      Potassium 3.5 mmol/L      Chloride 103 mmol/L      CO2 25.1 mmol/L      Calcium 8.4 (L) mg/dL      Total Protein 6.2 g/dL      Albumin 3.20 (L) g/dL      ALT (SGPT) 40 U/L      AST (SGOT) 63 (H) U/L      Alkaline Phosphatase 218 (H) U/L      Total Bilirubin 1.1 mg/dL      eGFR Non African Amer 96 mL/min/1.73      Globulin 3.0 gm/dL      A/G Ratio 1.1 g/dL      BUN/Creatinine Ratio 8.6     Anion Gap 11.9 mmol/L     Folate [[id number]]  (Abnormal) Collected:  04/04/18 0422    Specimen:  Blood Updated:  04/04/18 0523     Folate 3.11 (L) ng/mL      Vitamin B12 [[id number]]  (Normal) Collected:  04/04/18 0422    Specimen:  Blood Updated:  04/04/18 0522     Vitamin B-12 393 pg/mL     Comprehensive Metabolic Panel [[id number]]  (Abnormal) Collected:  04/03/18 0820    Specimen:  Blood Updated:  04/03/18 0856     Glucose 109 (H) mg/dL      BUN 9 mg/dL      Creatinine 0.77 mg/dL      Sodium 137 mmol/L      Potassium 4.1 mmol/L      Chloride 102 mmol/L      CO2 25.3 mmol/L      Calcium 8.7 (L) mg/dL      Total Protein 6.3 g/dL      Albumin 3.20 (L) g/dL      ALT (SGPT) 40 U/L      AST (SGOT) 64 (H) U/L      Alkaline Phosphatase 195 (H) U/L      Total Bilirubin 1.5 (H) mg/dL      eGFR Non African Amer 102 mL/min/1.73      Globulin 3.1 gm/dL      A/G Ratio 1.0 g/dL      BUN/Creatinine Ratio 11.7     Anion Gap 9.7 mmol/L     CBC & Differential [[id number]] Collected:  04/03/18 0820    Specimen:  Blood Updated:  04/03/18 0836    Narrative:       The following orders were created for panel order CBC & Differential.  Procedure                               Abnormality         Status                     ---------                               -----------         ------                     Scan Slide[[id number]]                                                                  CBC Auto Differential[[id number]]        Abnormal            Final result                 Please view results for these tests on the individual orders.    CBC Auto Differential [[id number]]  (Abnormal) Collected:  04/03/18 0820    Specimen:  Blood Updated:  04/03/18 0836     WBC 13.36 (H) 10*3/mm3      RBC 3.31 (L) 10*6/mm3      Hemoglobin 11.9 (L) g/dL      Hematocrit 35.6 (L) %      .6 (H) fL      MCH 36.0 (H) pg      MCHC 33.4 g/dL      RDW 13.3 %      RDW-SD 53.3 fl      MPV 10.9 (H) fL      Platelets 208 10*3/mm3      Neutrophil % 80.9 (H) %      Lymphocyte % 9.2 (L) %      Monocyte % 9.1 (H) %      Eosinophil % 0.1 %      Basophil % 0.1 %      Immature Grans % 0.6 (H) %      Neutrophils, Absolute  10.80 (H) 10*3/mm3      Lymphocytes, Absolute 1.23 10*3/mm3      Monocytes, Absolute 1.22 (H) 10*3/mm3      Eosinophils, Absolute 0.01 (L) 10*3/mm3      Basophils, Absolute 0.02 10*3/mm3      Immature Grans, Absolute 0.08 (H) 10*3/mm3      nRBC 0.0 /100 WBC     Anaerobic Culture - Bile, Gallbladder [[id number]] Collected:  04/02/18 1327    Specimen:  Bile from Gallbladder Updated:  04/02/18 1515    Comprehensive Metabolic Panel [[id number]]  (Abnormal) Collected:  04/02/18 0345    Specimen:  Blood Updated:  04/02/18 0529     Glucose 87 mg/dL      BUN 6 (L) mg/dL      Creatinine 0.75 (L) mg/dL      Sodium 137 mmol/L      Potassium 3.6 mmol/L      Chloride 100 mmol/L      CO2 23.6 mmol/L      Calcium 8.7 (L) mg/dL      Total Protein 6.5 g/dL      Albumin 3.40 (L) g/dL      ALT (SGPT) 31 U/L      AST (SGOT) 46 (H) U/L      Alkaline Phosphatase 208 (H) U/L      Total Bilirubin 1.7 (H) mg/dL      eGFR Non African Amer 105 mL/min/1.73      Globulin 3.1 gm/dL      A/G Ratio 1.1 g/dL      BUN/Creatinine Ratio 8.0     Anion Gap 13.4 mmol/L     Amylase [[id number]]  (Normal) Collected:  04/02/18 0345    Specimen:  Blood Updated:  04/02/18 0529     Amylase 40 U/L     Lipase [[id number]]  (Normal) Collected:  04/02/18 0345    Specimen:  Blood Updated:  04/02/18 0529     Lipase 19 U/L     aPTT [[id number]]  (Abnormal) Collected:  04/02/18 0345    Specimen:  Blood Updated:  04/02/18 0527     PTT 45.7 (H) seconds     Narrative:       PTT = The equivalent PTT values for the therapeutic range of heparin levels at 0.1 to 0.7 U/ml are 53 to 110 seconds.    Protime-INR [[id number]]  (Abnormal) Collected:  04/02/18 0345    Specimen:  Blood Updated:  04/02/18 0527     Protime 14.7 Seconds      INR 1.14 (H)    Narrative:       Therapeutic Ranges for INR: 2.0-3.0 (PT 20-30)                              2.5-3.5 (PT 25-34)    CBC & Differential [[id number]] Collected:  04/02/18 0345    Specimen:  Blood Updated:  04/02/18 0515    Narrative:        The following orders were created for panel order CBC & Differential.  Procedure                               Abnormality         Status                     ---------                               -----------         ------                     Scan Slide[[id number]]                                                                  CBC Auto Differential[[id number]]        Abnormal            Final result                 Please view results for these tests on the individual orders.    CBC Auto Differential [[id number]]  (Abnormal) Collected:  04/02/18 0345    Specimen:  Blood Updated:  04/02/18 0515     WBC 13.68 (H) 10*3/mm3      RBC 3.52 (L) 10*6/mm3      Hemoglobin 12.6 (L) g/dL      Hematocrit 37.7 (L) %      .1 (H) fL      MCH 35.8 (H) pg      MCHC 33.4 g/dL      RDW 13.5 %      RDW-SD 53.2 fl      MPV 11.6 (H) fL      Platelets 210 10*3/mm3      Neutrophil % 76.5 (H) %      Lymphocyte % 12.2 (L) %      Monocyte % 9.5 (H) %      Eosinophil % 0.9 %      Basophil % 0.5 %      Immature Grans % 0.4 %      Neutrophils, Absolute 10.47 (H) 10*3/mm3      Lymphocytes, Absolute 1.67 10*3/mm3      Monocytes, Absolute 1.30 (H) 10*3/mm3      Eosinophils, Absolute 0.12 10*3/mm3      Basophils, Absolute 0.07 10*3/mm3      Immature Grans, Absolute 0.05 (H) 10*3/mm3      nRBC 0.0 /100 WBC     Comprehensive Metabolic Panel [[id number]]  (Abnormal) Collected:  04/01/18 0439    Specimen:  Blood Updated:  04/01/18 0551     Glucose 113 (H) mg/dL      BUN 7 (L) mg/dL      Creatinine 0.72 (L) mg/dL      Sodium 135 (L) mmol/L      Potassium 3.9 mmol/L      Chloride 98 mmol/L      CO2 22.5 mmol/L      Calcium 9.0 mg/dL      Total Protein 7.1 g/dL      Albumin 3.90 g/dL      ALT (SGPT) 40 U/L      AST (SGOT) 82 (H) U/L      Alkaline Phosphatase 240 (H) U/L      Total Bilirubin 1.6 (H) mg/dL      eGFR Non African Amer 110 mL/min/1.73      Globulin 3.2 gm/dL      A/G Ratio 1.2 g/dL      BUN/Creatinine Ratio 9.7     Anion Gap 14.5 mmol/L      TSH [[id number]]  (Normal) Collected:  04/01/18 0439    Specimen:  Blood Updated:  04/01/18 0551     TSH 3.700 mIU/mL     Protime-INR [[id number]]  (Normal) Collected:  04/01/18 0439    Specimen:  Blood Updated:  04/01/18 0520     Protime 12.8 Seconds      INR 0.96    Narrative:       Therapeutic Ranges for INR: 2.0-3.0 (PT 20-30)                              2.5-3.5 (PT 25-34)    CBC Auto Differential [[id number]]  (Abnormal) Collected:  04/01/18 0439    Specimen:  Blood Updated:  04/01/18 0452     WBC 17.42 (H) 10*3/mm3      RBC 3.79 (L) 10*6/mm3      Hemoglobin 13.7 (L) g/dL      Hematocrit 40.3 (L) %      .3 (H) fL      MCH 36.1 (H) pg      MCHC 34.0 g/dL      RDW 13.3 %      RDW-SD 53.1 fl      MPV 10.8 (H) fL      Platelets 244 10*3/mm3      Neutrophil % 83.3 (H) %      Lymphocyte % 7.7 (L) %      Monocyte % 7.0 %      Eosinophil % 0.9 %      Basophil % 0.7 %      Immature Grans % 0.4 %      Neutrophils, Absolute 14.50 (H) 10*3/mm3      Lymphocytes, Absolute 1.35 10*3/mm3      Monocytes, Absolute 1.22 (H) 10*3/mm3      Eosinophils, Absolute 0.16 10*3/mm3      Basophils, Absolute 0.12 10*3/mm3      Immature Grans, Absolute 0.07 (H) 10*3/mm3      nRBC 0.0 /100 WBC     Urinalysis, Microscopic Only - Urine, Clean Catch [[id number]]  (Abnormal) Collected:  03/31/18 1943    Specimen:  Urine from Urine, Clean Catch Updated:  03/31/18 2006     RBC, UA 0-2 (A) /HPF      WBC, UA None Seen /HPF      Bacteria, UA Trace (A) /HPF      Squamous Epithelial Cells, UA None Seen /HPF      Hyaline Casts, UA None Seen /LPF      Methodology Manual Light Microscopy    Urinalysis With / Culture If Indicated - Urine, Clean Catch [[id number]]  (Abnormal) Collected:  03/31/18 1943    Specimen:  Urine from Urine, Clean Catch Updated:  03/31/18 2006     Color, UA Yellow     Appearance, UA Clear     pH, UA 6.0     Specific Gravity, UA 1.015     Glucose, UA Negative     Ketones, UA Negative     Bilirubin, UA Negative     Blood, UA  Trace (A)     Protein, UA Negative     Leuk Esterase, UA Negative     Nitrite, UA Negative     Urobilinogen, UA 0.2 E.U./dL    CBC & Differential [[id number]] Collected:  03/31/18 1751    Specimen:  Blood Updated:  03/31/18 1919    Narrative:       The following orders were created for panel order CBC & Differential.  Procedure                               Abnormality         Status                     ---------                               -----------         ------                     Scan Slide[[id number]]                                       Final result               CBC Auto Differential[[id number]]        Abnormal            Final result                 Please view results for these tests on the individual orders.    Scan Slide [[id number]] Collected:  03/31/18 1751    Specimen:  Blood Updated:  03/31/18 1919     Macrocytes Slight/1+     Stomatocytes Slight/1+     WBC Morphology Normal     Platelet Morphology Normal    CBC Auto Differential [[id number]]  (Abnormal) Collected:  03/31/18 1751    Specimen:  Blood Updated:  03/31/18 1903     WBC 16.01 (H) 10*3/mm3      RBC 3.90 (L) 10*6/mm3      Hemoglobin 14.2 g/dL      Hematocrit 41.5 (L) %      .4 (H) fL      MCH 36.4 (H) pg      MCHC 34.2 g/dL      RDW 13.5 %      RDW-SD 53.4 fl      MPV 11.0 (H) fL      Platelets 279 10*3/mm3      Neutrophil % 85.4 (H) %      Lymphocyte % 9.3 (L) %      Monocyte % 3.9 %      Eosinophil % 0.4 %      Basophil % 0.6 %      Immature Grans % 0.4 %      Neutrophils, Absolute 13.68 (H) 10*3/mm3      Lymphocytes, Absolute 1.49 10*3/mm3      Monocytes, Absolute 0.62 10*3/mm3      Eosinophils, Absolute 0.06 (L) 10*3/mm3      Basophils, Absolute 0.10 10*3/mm3      Immature Grans, Absolute 0.06 (H) 10*3/mm3      nRBC 0.0 /100 WBC     Comprehensive Metabolic Panel [[id number]]  (Abnormal) Collected:  03/31/18 1751    Specimen:  Blood Updated:  03/31/18 1858     Glucose 140 (H) mg/dL      BUN 8 mg/dL      Creatinine 0.80 mg/dL       Sodium 133 (L) mmol/L      Potassium 3.5 mmol/L      Chloride 95 (L) mmol/L      CO2 23.4 mmol/L      Calcium 9.3 mg/dL      Total Protein 8.1 g/dL      Albumin 4.30 g/dL      ALT (SGPT) 37 U/L      AST (SGOT) 82 (H) U/L      Alkaline Phosphatase 253 (H) U/L      Total Bilirubin 0.8 mg/dL      eGFR Non African Amer 98 mL/min/1.73      Globulin 3.8 gm/dL      A/G Ratio 1.1 g/dL      BUN/Creatinine Ratio 10.0     Anion Gap 14.6 mmol/L     Troponin [[id number]]  (Normal) Collected:  03/31/18 1751    Specimen:  Blood Updated:  03/31/18 1837     Troponin T <0.010 ng/mL     Narrative:       Troponin T Reference Ranges:  Less than 0.03 ng/mL:    Negative for AMI  0.03 to 0.09 ng/mL:      Indeterminant for AMI  Greater than 0.09 ng/mL: Positive for AMI    Lipase [[id number]]  (Normal) Collected:  03/31/18 1751    Specimen:  Blood Updated:  03/31/18 1835     Lipase 25 U/L     Lactic Acid, Plasma [[id number]]  (Normal) Collected:  03/31/18 1751    Specimen:  Blood Updated:  03/31/18 1832     Lactate 1.4 mmol/L     aPTT [[id number]]  (Abnormal) Collected:  03/31/18 1751    Specimen:  Blood Updated:  03/31/18 1828     PTT 38.9 (H) seconds     Narrative:       PTT = The equivalent PTT values for the therapeutic range of heparin levels at 0.1 to 0.7 U/ml are 53 to 110 seconds.    Protime-INR [[id number]]  (Normal) Collected:  03/31/18 1751    Specimen:  Blood Updated:  03/31/18 1828     Protime 12.9 Seconds      INR 0.97    Narrative:       Therapeutic Ranges for INR: 2.0-3.0 (PT 20-30)                              2.5-3.5 (PT 25-34)        Imaging Results (most recent)     Procedure Component Value Units Date/Time    US Abdomen Complete [[id number]] Collected:  04/02/18 1000     Updated:  04/02/18 1009    Narrative:       INDICATION: Abdominal pain for 2 days.     EXAM: Abdominal ultrasound, complete.     COMPARISON: CT abdomen 3/31/2018     FINDINGS:  Pancreas is normal. Liver parenchyma is homogeneous with no focal mass.  There is a  tiny cyst in the left hepatic lobe. Gallbladder is distended  with a mildly thickened wall and a trace amount of pericholecystic  fluid. Gallbladder sludge is noted. There may be some adenomyomatosis in  the gallbladder wall. Common duct is prominent for size and 7 mm. No  stone is seen within the duct. IVC is patent by Doppler. Abdominal aorta  is normal in caliber. Spleen size is normal with a douy-zf-kuvw length  of 11.4 cm. Both kidneys are morphologically normal and nonobstructed.       Impression:          1. Distended gallbladder with mildly thickened wall and some  pericholecystic fluid. There is also gallbladder sludge and probably  adenomyomatosis of the gallbladder wall. Common duct is prominent as  well at 7 mm.  2. The remainder of the abdominal ultrasound is negative.     A preliminary report was provided by Dr. Hinds at 1115 hours on 4/1/2018.     This report was finalized on 4/2/2018 10:07 AM by Dr. Jatin Pollock MD.       NM Hepatobiliary Without CCK [[id number]] Collected:  04/02/18 0955     Updated:  04/02/18 1000    Narrative:       Hepatobiliary scan     INDICATION: Right upper quadrant pain with fever and elevated white  blood cell count. Abnormal CT 3/31/2018 showing cystic duct stone.     FINDINGS: Imaging was obtained of the abdomen for 2 hours after the IV  administration of 5.7 mCi of technetium 99m-Choletec. COMPARISON made  with CT abdomen 3/31/2018.     There is prompt uptake by the liver with prompt biliary and small bowel  activity seen within 15 minutes. Gallbladder is not seen on the  examination. This is concerning for cystic duct obstruction and acute  cholecystitis.       Impression:       Nonvisualization of the gallbladder at 2 hours suspicious  for acute cholecystitis. No biliary obstruction.     A preliminary report was provided by Dr. Collier at 3:50 PM on 4/1/2018.     This report was finalized on 4/2/2018 9:58 AM by Dr. Jatin Pollock MD.       XR Chest PA & Lateral  [[id number]] Collected:  04/02/18 0834     Updated:  04/02/18 0837    Narrative:       Chest x-ray     INDICATION: Preoperative exam for cholecystectomy. COPD. Reflux disease.     FINDINGS: PA and lateral views of the chest were obtained. No  comparison. Lungs are clear. Cardiac and mediastinal contours are  normal. No pneumothorax.       Impression:       No active disease.     A preliminary report was provided by Dr. Collier at 1540 hours on  4/1/2018.     This report was finalized on 4/2/2018 8:35 AM by Dr. Jatin Pollock MD.       CT Abdomen Pelvis With Contrast [[id number]] Collected:  04/01/18 0736     Updated:  04/01/18 0741    Narrative:       CT ABDOMEN AND PELVIS WITH INTRAVENOUS CONTRAST     HISTORY:  Diffuse abdominal pain worsening over the past day. Colonoscopy  yesterday.     TECHNIQUE:  Axial images were obtained with intravenous contrast. 100 cc of Isovue  was used. COMPARISON from 2/16/2018. 1 previous CT and no nuclear  cardiac studies over the past year. Radiation dose reduction techniques  were utilized, including automated exposure control and exposure  modulation based on body size.     ABDOMEN FINDINGS:  The lung bases are clear. There is no evidence of free air in the  peritoneal cavity. Fatty infiltration of the liver is noted. Shows  improvement since the scan of 2/16/2018. A small hepatic cyst is  unchanged. The spleen kidneys adrenals and pancreas have a normal  appearance. The gallbladder is distended and there is a small  pericholecystic fluid collection that was not seen on the previous exam.  There is a small partially calcified gallstone in the gallbladder neck  or cystic duct. The common bile duct is nondilated. FINDINGS worrisome  for early cholecystitis. There is no evidence of retroperitoneal  adenopathy or ascites and no distended bowel loops are seen.     PELVIS FINDINGS:  In the pelvis there is no evidence of adenopathy mass or fluid  collection. Normal appendix.        Impression:       Small cystic duct stone with pericholecystic fluid  suspicious for early cholecystitis. Improvement in hepatic steatosis.  Normal appendix. No evidence of free air in the peritoneal cavity.     This report was finalized on 4/1/2018 7:39 AM by Dr. Jatin Braga MD.             PROCEDURES  Procedure(s):  CHOLECYSTECTOMY LAPAROSCOPIC converted to open cholecystectomy  CHOLECYSTECTOMY    Condition on Discharge:  Stable    Physical Exam at Discharge  Vital Signs  Temp:  [97.6 °F (36.4 °C)-98.5 °F (36.9 °C)] 97.6 °F (36.4 °C)  Heart Rate:  [74-87] 76  Resp:  [18-20] 20  BP: (136-146)/(76-86) 139/76    Physical Exam:  Physical Exam   Constitutional: Patient appears well developed, thin, slightly older than his stated age and in no acute distress   HEENT:   Head: Normocephalic and atraumatic.   Eyes: EOM are intact. Sclera are anicteric and non-injected.  Mouth and Throat: Patient has moist mucous membranes.      Neck: Neck supple. No JVD present.   Cardiovascular: Regular rate, regular rhythm.  Exam reveals no gallop and no friction rub.  No murmur heard.  Pulmonary/Chest: Lungs with slightly diminished breath sounds throughout. No respiratory distress. No wheezes. No rhonchi. No rales.   Abdominal: Soft. Bowel sounds are normal. Incisions are C/D/I. No erythema. SANAZ drain removed this AM and gauze over site.  Extremities: No edema. Pulses are palpable in all 4 extremities.  Neurological: Patient is alert and oriented to person, place, and time.   Skin: Skin is warm and dry. No cyanosis or erythema.    Discharge Disposition  Home    Visiting Nurse:    No     Home PT/OT:  No     Home Safety Evaluation:  No     DME  None    Discharge Diet:      Dietary Orders     Start     Ordered    04/05/18 1002  Diet Regular  Diet Effective Now     Question:  Diet Texture / Consistency  Answer:  Regular    04/05/18 1002          Activity at Discharge:  Restrictions and limitations per surgery    Pre-discharge  education  Medications and follow-up      Follow-up Appointments  Future Appointments  Date Time Provider Department Center   4/13/2018 3:40 PM Marley Tamez MD [facility] None   4/23/2018 3:00 PM Geri Alatorre PA-C [facility] None   5/8/2018 2:00 PM Nicolasa Stewart MD [facility] None     Additional Instructions for the Follow-ups that You Need to Schedule     Discharge Follow-up with PCP    As directed      Follow Up Details:  1 week (F/U HTN and folate deficiency)         Discharge Follow-up with Specialty: Dr. Tamez; 1 Week    As directed      Specialty:  Dr. Tamez    Follow Up:  1 Week    Follow Up Details:  staple removal at that time         Discharge Follow-up with Specified Provider: Dr. Stewart; 1 Month    As directed      To:  Dr. Stewart    Follow Up:  1 Month    Follow Up Details:  New hepatic cirrhosis               Test Results Pending at Discharge   Order Current Status    Anaerobic Culture - Bile, Gallbladder In process           Jaylin Matthew MD  04/06/18  11:52 AM    Time: Discharge >30 min Secondary to discussion of patient with Dr. Bullard, discussion of meds and F/U with patient and wife at bedside, exam, orders and documentation.

## 2018-04-06 NOTE — DISCHARGE INSTR - APPOINTMENTS
Patient needs to schedule follow-up w/Brenda Head@  [facility] in one week for (f/u HTN and folate deficiency.  Medical clinic in [place], [place]   Address: [facility] [address], [place] [address]   Hours:   Friday Closed   Saturday Closed   Sunday Closed   Monday 8:30AM-4:30PM   Tuesday 8:30AM-7:30PM   Wednesday 8:30AM-4:30PM   Thursday 8:30AM-4:30PM   Phone: [phone]

## 2018-04-07 LAB — BACTERIA SPEC ANAEROBE CULT: NORMAL

## 2018-04-13 ENCOUNTER — OFFICE VISIT (OUTPATIENT)
Dept: SURGERY | Facility: CLINIC | Age: 63
End: 2018-04-13

## 2018-04-13 DIAGNOSIS — Z09 SURGERY FOLLOW-UP: Primary | ICD-10-CM

## 2018-04-13 PROCEDURE — 99024 POSTOP FOLLOW-UP VISIT: CPT | Performed by: SURGERY

## 2018-04-13 RX ORDER — OXYCODONE HYDROCHLORIDE AND ACETAMINOPHEN 5; 325 MG/1; MG/1
1 TABLET ORAL EVERY 8 HOURS PRN
Qty: 20 TABLET | Refills: 0 | Status: SHIPPED | OUTPATIENT
Start: 2018-04-13 | End: 2018-04-23 | Stop reason: SDUPTHER

## 2018-04-13 NOTE — PROGRESS NOTES
PATIENT INFORMATION  Babak Herrera   - 1955    CHIEF COMPLAINT  Chief Complaint   Patient presents with   • Post-op     11 day s/p Open Ana Paula       HISTORY OF PRESENT ILLNESS  HPI  Patient presents to the office today status post laparoscopic converted to open cholecystectomy for acute necrotizing calculus cholecystitis.  He was discharged from the hospital last week.  He reports since hospital discharge he is doing well.  Denies fevers, chills, chest pain, shortness of breath, nausea, vomiting.  Does complain of incisional abdominal pain mostly with movement.  Reports the pain is getting better each day.  Is passing flatus and having regular bowel movements.  Reports his appetite is good.  He was on intravenous Zosyn for intraoperative cultures were positive for Escherichia coli upon discharge he was prescribed Augmentin by the hospitalist.  Patient reports he completed the antibiotics.  He was followed by one of my associates Dr. Bullard as I was out of town.  His Juanpablo-Jane drain was removed prior to hospital discharge.  And LFTs were normalizing including a normal total bilirubin upon hospital discharge 18.  I did review hospital progress notes and discharge summary.      He is also scheduled to see gastroenterology-Dr. Stewart for cirrhosis and elevated transaminases.  He reports he still abstaining from alcohol.  He does have history of heavy EtOH use.    REVIEW OF SYSTEMS  Review of Systems  As per history of present illness    ACTIVE PROBLEMS  Patient Active Problem List    Diagnosis   • Acute cholecystitis [K81.0]     Overview Note:     Added automatically from request for surgery [id number]     • Right upper quadrant abdominal pain [R10.11]     Overview Note:     Added automatically from request for surgery [id number]     • Abdominal pain [R10.9]   • Elevated LFTs [R79.89]   • Encounter for screening for malignant neoplasm of colon [Z12.11]     Overview Note:     Added automatically from request  for surgery [id number]           PAST MEDICAL HISTORY  Past Medical History:   Diagnosis Date   • COPD (chronic obstructive pulmonary disease)    • GERD (gastroesophageal reflux disease)    • Hypercholesteremia    • Hypertension          SURGICAL HISTORY  Past Surgical History:   Procedure Laterality Date   • CHOLECYSTECTOMY N/A 4/2/2018    Procedure: CHOLECYSTECTOMY LAPAROSCOPIC converted to open cholecystectomy;  Surgeon: Marley Tamez MD;  Location: [facility];  Service: General   • CHOLECYSTECTOMY N/A 4/2/2018    Procedure: CHOLECYSTECTOMY;  Surgeon: Marley Tamez MD;  Location: [facility] OR;  Service: General   • COLONOSCOPY     • COLONOSCOPY W/ POLYPECTOMY N/A 3/30/2018    Procedure: COLONOSCOPY,  polypectomy;  Surgeon: Nicolasa Stewart MD;  Location: [facility] OR;  Service: Gastroenterology         FAMILY HISTORY  Family History   Problem Relation Age of Onset   • Cancer Father          SOCIAL HISTORY  Social History     Occupational History   • Not on file.     Social History Main Topics   • Smoking status: Current Every Day Smoker     Packs/day: 2.00     Years: 50.00   • Smokeless tobacco: Never Used   • Alcohol use No      Comment: quit 3/1/18, DRANK 4.3 LITERS 3X WEEK   • Drug use: No   • Sexual activity: Defer         CURRENT MEDICATIONS    Current Outpatient Prescriptions:   •  amLODIPine (NORVASC) 5 MG tablet, Take 5 mg by mouth Daily., Disp: , Rfl:   •  aspirin 81 MG EC tablet, Take 81 mg by mouth Daily., Disp: , Rfl:   •  budesonide-formoterol (SYMBICORT) 160-4.5 MCG/ACT inhaler, Inhale 2 puffs 2 (Two) Times a Day As Needed., Disp: , Rfl:   •  esomeprazole (nexIUM) 20 MG capsule, Take 20 mg by mouth Every Morning Before Breakfast., Disp: , Rfl:   •  folic acid (FOLVITE) 800 MCG tablet, Take 1 tablet by mouth Daily., Disp: 30 tablet, Rfl: 0  •  nicotine (NICODERM CQ) 21 MG/24HR patch, Place 1 patch on the skin Daily. If patient desires to continue smoking cessation., Disp: 21 patch, Rfl: 0  •   oxyCODONE-acetaminophen (PERCOCET) 5-325 MG per tablet, Take 1 tablet by mouth Every 4 (Four) Hours As Needed for Moderate Pain ., Disp: , Rfl:   •  oxyCODONE-acetaminophen (PERCOCET) 5-325 MG per tablet, Take 1 tablet by mouth Every 8 (Eight) Hours As Needed for Severe Pain ., Disp: 20 tablet, Rfl: 0  •  polyethylene glycol (MIRALAX) pack packet, Take 17 g by mouth Daily. Over the counter, Disp: , Rfl:   •  saccharomyces boulardii (FLORASTOR) 250 MG capsule, Take 1 capsule by mouth 2 (Two) Times a Day., Disp: 60 capsule, Rfl: 0    ALLERGIES  Erythromycin    VITALS  There were no vitals filed for this visit.    LAST RESULTS   Admission on 03/31/2018, Discharged on 04/06/2018   No results displayed because visit has over 200 results.        Nm Hepatobiliary Without Cck    Result Date: 4/2/2018  Narrative: Hepatobiliary scan  INDICATION: Right upper quadrant pain with fever and elevated white blood cell count. Abnormal CT 3/31/2018 showing cystic duct stone.  FINDINGS: Imaging was obtained of the abdomen for 2 hours after the IV administration of 5.7 mCi of technetium 99m-Choletec. COMPARISON made with CT abdomen 3/31/2018.  There is prompt uptake by the liver with prompt biliary and small bowel activity seen within 15 minutes. Gallbladder is not seen on the examination. This is concerning for cystic duct obstruction and acute cholecystitis.      Impression: Nonvisualization of the gallbladder at 2 hours suspicious for acute cholecystitis. No biliary obstruction.  A preliminary report was provided by Dr. Collier at 3:50 PM on 4/1/2018.  This report was finalized on 4/2/2018 9:58 AM by Dr. Jatin Pollock MD.      Us Abdomen Complete    Result Date: 4/2/2018  Narrative: INDICATION: Abdominal pain for 2 days.  EXAM: Abdominal ultrasound, complete.  COMPARISON: CT abdomen 3/31/2018  FINDINGS: Pancreas is normal. Liver parenchyma is homogeneous with no focal mass. There is a tiny cyst in the left hepatic lobe. Gallbladder  is distended with a mildly thickened wall and a trace amount of pericholecystic fluid. Gallbladder sludge is noted. There may be some adenomyomatosis in the gallbladder wall. Common duct is prominent for size and 7 mm. No stone is seen within the duct. IVC is patent by Doppler. Abdominal aorta is normal in caliber. Spleen size is normal with a tofz-tz-jsho length of 11.4 cm. Both kidneys are morphologically normal and nonobstructed.      Impression:  1. Distended gallbladder with mildly thickened wall and some pericholecystic fluid. There is also gallbladder sludge and probably adenomyomatosis of the gallbladder wall. Common duct is prominent as well at 7 mm. 2. The remainder of the abdominal ultrasound is negative.  A preliminary report was provided by Dr. Hinds at 1115 hours on 4/1/2018.  This report was finalized on 4/2/2018 10:07 AM by Dr. Jatin Pollock MD.      Ct Abdomen Pelvis With Contrast    Result Date: 4/1/2018  Narrative: CT ABDOMEN AND PELVIS WITH INTRAVENOUS CONTRAST  HISTORY: Diffuse abdominal pain worsening over the past day. Colonoscopy yesterday.  TECHNIQUE: Axial images were obtained with intravenous contrast. 100 cc of Isovue was used. COMPARISON from 2/16/2018. 1 previous CT and no nuclear cardiac studies over the past year. Radiation dose reduction techniques were utilized, including automated exposure control and exposure modulation based on body size.  ABDOMEN FINDINGS: The lung bases are clear. There is no evidence of free air in the peritoneal cavity. Fatty infiltration of the liver is noted. Shows improvement since the scan of 2/16/2018. A small hepatic cyst is unchanged. The spleen kidneys adrenals and pancreas have a normal appearance. The gallbladder is distended and there is a small pericholecystic fluid collection that was not seen on the previous exam. There is a small partially calcified gallstone in the gallbladder neck or cystic duct. The common bile duct is nondilated. FINDINGS  worrisome for early cholecystitis. There is no evidence of retroperitoneal adenopathy or ascites and no distended bowel loops are seen.  PELVIS FINDINGS: In the pelvis there is no evidence of adenopathy mass or fluid collection. Normal appendix.      Impression: Small cystic duct stone with pericholecystic fluid suspicious for early cholecystitis. Improvement in hepatic steatosis. Normal appendix. No evidence of free air in the peritoneal cavity.  This report was finalized on 4/1/2018 7:39 AM by Dr. Jatin Braga MD.      Xr Chest Pa & Lateral    Result Date: 4/2/2018  Narrative: Chest x-ray  INDICATION: Preoperative exam for cholecystectomy. COPD. Reflux disease.  FINDINGS: PA and lateral views of the chest were obtained. No comparison. Lungs are clear. Cardiac and mediastinal contours are normal. No pneumothorax.      Impression: No active disease.  A preliminary report was provided by Dr. Collier at 1540 hours on 4/1/2018.  This report was finalized on 4/2/2018 8:35 AM by Dr. Jatin Pollock MD.        PHYSICAL EXAM  Physical Exam   Constitutional: He appears well-developed and well-nourished.   HENT:   Head: Normocephalic and atraumatic.   Cardiovascular: Normal rate, regular rhythm and normal heart sounds.    Pulmonary/Chest: Breath sounds normal.   Abdominal:   Soft, nondistended nontender positive bowel sounds in all 4 quadrants    Right upper quadrant and infraumbilical incision healing well,  staples removed and Steri-Strips applied   Musculoskeletal: He exhibits no edema.   Nursing note and vitals reviewed.      ASSESSMENT  Status post laparoscopic converted to open cholecystectomy for acute necrotizing cholecystitis   Wounds healing well  Operative and pathology results discussed with the patient  Including intraoperative finding of micronodular cirrhosis       Diet, activity, wound care and weightlifting restrictions discussed.      PLAN  Follow-up 2 weeks.  RX for Percocet 5/325 mg 1 PO every 8 hours PRN  severe pain #20, no refills was given to the patient.  Patient cautioned against greater than 2 g per 24 hour use of acetaminophen.  Verbalized understanding    Follow-up with gastroenterology on 5/8/18 - will likely need referral to hepatology.  Will defer to Dr. Stewart at this time.    Patient was advised to call the office sooner should he have worsening symptoms or go to the nearest emergency room.

## 2018-04-23 ENCOUNTER — OFFICE VISIT (OUTPATIENT)
Dept: NEUROSURGERY | Facility: CLINIC | Age: 63
End: 2018-04-23

## 2018-04-23 VITALS
BODY MASS INDEX: 20.7 KG/M2 | SYSTOLIC BLOOD PRESSURE: 126 MMHG | WEIGHT: 128.8 LBS | HEIGHT: 66 IN | HEART RATE: 76 BPM | DIASTOLIC BLOOD PRESSURE: 81 MMHG

## 2018-04-23 DIAGNOSIS — M79.672 FOOT PAIN, BILATERAL: Primary | ICD-10-CM

## 2018-04-23 DIAGNOSIS — M79.671 FOOT PAIN, BILATERAL: Primary | ICD-10-CM

## 2018-04-23 DIAGNOSIS — M48.062 SPINAL STENOSIS, LUMBAR REGION, WITH NEUROGENIC CLAUDICATION: ICD-10-CM

## 2018-04-23 DIAGNOSIS — R26.9 ABNORMALITY OF GAIT: ICD-10-CM

## 2018-04-23 PROCEDURE — 99244 OFF/OP CNSLTJ NEW/EST MOD 40: CPT | Performed by: PHYSICIAN ASSISTANT

## 2018-04-23 RX ORDER — GABAPENTIN 300 MG/1
300 CAPSULE ORAL 3 TIMES DAILY
COMMUNITY

## 2018-04-23 NOTE — PROGRESS NOTES
Subjective   Patient ID: Babak Herrera is a 62 y.o. male is being seen for consultation today at the request of JOHN Ruiz  For bilateral leg pain R>L and n/t in feet.  He denies any cause or injury.  He has not had any treatments.  Mr. Herrera takes Oxycodone 5/325 BID for pain (recently had gall bladder removed).       Leg Pain    The incident occurred more than 1 week ago. There was no injury mechanism. The pain is present in the left foot and right foot (R>L ). The pain is at a severity of 10/10. The pain is severe. The pain has been constant since onset. Associated symptoms include numbness (bilateral feet (foot pain) ) and tingling (bilateral feet (foot pain)). Pertinent negatives include no inability to bear weight, loss of motion, loss of sensation or muscle weakness. He reports no foreign bodies present. Nothing aggravates the symptoms. He has tried NSAIDs and acetaminophen for the symptoms. The treatment provided no relief.       The following portions of the patient's history were reviewed and updated as appropriate: allergies, current medications, past family history, past medical history, past social history, past surgical history and problem list.    Review of Systems   Respiratory: Positive for cough.    Endocrine: Positive for cold intolerance, polydipsia and polyuria.   Genitourinary: Positive for urgency. Negative for difficulty urinating.   Musculoskeletal: Positive for gait problem. Negative for back pain and neck pain.   Neurological: Positive for tingling (bilateral feet (foot pain)) and numbness (bilateral feet (foot pain) ). Negative for weakness.   Hematological: Bruises/bleeds easily.   All other systems reviewed and are negative.      Objective   Physical Exam   Constitutional: He is oriented to person, place, and time. He appears well-developed and well-nourished.   HENT:   Head: Normocephalic and atraumatic.   Right Ear: External ear normal.   Left Ear: External ear normal.   Eyes:  Conjunctivae and EOM are normal. Pupils are equal, round, and reactive to light. Right eye exhibits no discharge. Left eye exhibits no discharge.   Neck: Normal range of motion. Neck supple. No tracheal deviation present.   Cardiovascular: Intact distal pulses.    Pulmonary/Chest: Effort normal. No stridor. No respiratory distress.   Abdominal: There is tenderness.   Has incision from recent surgery   Musculoskeletal: Normal range of motion. He exhibits no edema, tenderness or deformity.   Neurological: He is alert and oriented to person, place, and time. He has normal strength and normal reflexes. He displays no atrophy, no tremor and normal reflexes. No cranial nerve deficit or sensory deficit. He exhibits normal muscle tone. He displays a negative Romberg sign. He displays no seizure activity. Coordination and gait normal.   No long tract signs   Skin: Skin is warm and dry.   Psychiatric: He has a normal mood and affect. His behavior is normal. Judgment and thought content normal.   Nursing note and vitals reviewed.    Neurologic Exam     Mental Status   Oriented to person, place, and time.     Cranial Nerves     CN III, IV, VI   Pupils are equal, round, and reactive to light.  Extraocular motions are normal.     Motor Exam     Strength   Strength 5/5 throughout.       Assessment/Plan   Independent Review of Radiographic Studies:    I did review the lumbar spine MRI from March 15, 2018.  It shows multilevel disc degeneration as well as facet arthropathy and disc bulging.  There is a mild degenerative anterolisthesis at L3-L4.  There is fairly severe stenosis at L2-L3 and L3-L4 with more mild to moderate stenosis at L4-L5.  Medical Decision Making:    Mr. Herrera was referred to us by JOHN Rodriguez, for a several year history of severe bilateral foot pain as well as a 6 month history of gait difficulty.  The patient denies any back pain or leg pain and admits only to occasional episodes of numbness or tingling in  his legs which are rare.  The symptoms do not bother him at all.  However he describes severe constant stabbing type pain in his feet.  He also reports gait and balance difficulties.  The gait issues are most common when he first stands up and begins to walk.  He denies dizziness or weakness or numbness or tingling in his arms or torso or incontinence.  He has never had any treatment for the symptoms.  He did have lower extremity arterial ultrasounds in February which did not reveal any arterial occlusion or stenosis.  He is not diabetic.  He does have a 50 year history of significant alcohol abuse and stopped drinking about 7 weeks ago when he was found to have acute necrotizing cholecystitis as well as cirrhosis and elevated transaminases.  He underwent an open cholecystectomy by Dr. Joi daugherty 3 weeks ago and has an appointment with him this Friday as well as a referral to a gastroenterologist for the elevated transaminases and cirrhosis.    His exam does not reveal any focal deficits.  DTRs are 1-2+.  However his gait is somewhat wide stepped, particularly with any attempt at walking on his toes.  He has decreased pinprick and soft touch sensation in both feet and I suspect his gait difficulties may be related to the sensory difficulties in his feet.    In going to refer him to Dr. Peters or a lower extremity EMG/NCS.  He could potentially have some peripheral neuropathy related to his extensive alcohol abuse.  In the interim he will start gabapentin 300 mg 3 times a day.  He was warned of the potential for sedation.  He understands that Dr. Cohen will not continue to prescribe this and he will need to get additional refills through his family physician if the medication helps.  Otherwise he will follow-up after the EMG/NCS and we can make additional recommendations at that time.  Babak was seen today for leg pain and foot pain.    Diagnoses and all orders for this visit:    Foot pain, bilateral  -     EMG  Both Legs; Future  -     Nerve Conduction Test Both Legs; Future    Abnormality of gait  -     EMG Both Legs; Future  -     Nerve Conduction Test Both Legs; Future    Spinal stenosis, lumbar region, with neurogenic claudication      Return for after EMG/NCS with Dr. Peters.

## 2018-04-24 PROBLEM — D12.5 ADENOMATOUS POLYP OF SIGMOID COLON: Status: ACTIVE | Noted: 2018-04-24

## 2018-04-27 ENCOUNTER — OFFICE VISIT (OUTPATIENT)
Dept: SURGERY | Facility: CLINIC | Age: 63
End: 2018-04-27

## 2018-04-27 VITALS
WEIGHT: 128 LBS | DIASTOLIC BLOOD PRESSURE: 72 MMHG | SYSTOLIC BLOOD PRESSURE: 118 MMHG | BODY MASS INDEX: 20.57 KG/M2 | HEIGHT: 66 IN

## 2018-04-27 DIAGNOSIS — Z09 SURGERY FOLLOW-UP: Primary | ICD-10-CM

## 2018-04-27 PROCEDURE — 99024 POSTOP FOLLOW-UP VISIT: CPT | Performed by: SURGERY

## 2018-04-27 RX ORDER — OXYCODONE HYDROCHLORIDE AND ACETAMINOPHEN 5; 325 MG/1; MG/1
1 TABLET ORAL EVERY 6 HOURS PRN
Qty: 20 TABLET | Refills: 0 | Status: SHIPPED | OUTPATIENT
Start: 2018-04-27

## 2018-04-27 NOTE — PROGRESS NOTES
PATIENT INFORMATION  Babak Herrera   - 1955    CHIEF COMPLAINT  Chief Complaint   Patient presents with   • Post-op Follow-up   3 wks 4 days s/p open santiago, c/o some pain      HISTORY OF PRESENT ILLNESS  HPI  Patient presents to the office today for his postoperative visit.  He is status post laparoscopic converted open cholecystectomy.  He reports he is doing well.  Denies any nausea, vomiting, chest pain, shortness of breath, fevers or chills.  Reports occasional abdominal pain.  He has resumed work.  He reports after driving and with the seatbelt laying across his abdominal wall and incision he does experience pain.  He reports bowel movements are regular other than occasional constipation.  He follows with Dr. Goldberg and recently had a colonoscopy with her.      REVIEW OF SYSTEMS  Review of Systems  As per history of present illness    ACTIVE PROBLEMS  Patient Active Problem List    Diagnosis   • Adenomatous polyp of sigmoid colon [D12.5]   • Foot pain, bilateral [M79.671, M79.672]   • Abnormality of gait [R26.9]   • Spinal stenosis, lumbar region, with neurogenic claudication [M48.062]   • Acute cholecystitis [K81.0]     Overview Note:     Added automatically from request for surgery [id number]     • Right upper quadrant abdominal pain [R10.11]     Overview Note:     Added automatically from request for surgery [id number]     • Abdominal pain [R10.9]   • Elevated LFTs [R79.89]   • Encounter for screening for malignant neoplasm of colon [Z12.11]     Overview Note:     Added automatically from request for surgery [id number]           PAST MEDICAL HISTORY  Past Medical History:   Diagnosis Date   • Colon polyp    • COPD (chronic obstructive pulmonary disease)    • GERD (gastroesophageal reflux disease)    • Hypercholesteremia    • Hypertension          SURGICAL HISTORY  Past Surgical History:   Procedure Laterality Date   • CHOLECYSTECTOMY N/A 2018    Procedure: CHOLECYSTECTOMY LAPAROSCOPIC converted to  open cholecystectomy;  Surgeon: Marley Tamez MD;  Location: [facility] OR;  Service: General   • CHOLECYSTECTOMY N/A 4/2/2018    Procedure: CHOLECYSTECTOMY;  Surgeon: Marley Tamez MD;  Location: [facility] OR;  Service: General   • COLONOSCOPY     • COLONOSCOPY W/ POLYPECTOMY N/A 3/30/2018    Procedure: COLONOSCOPY,  polypectomy;  Surgeon: Nicolasa Stewart MD;  Location: [facility] OR;  Service: Gastroenterology         FAMILY HISTORY  Family History   Problem Relation Age of Onset   • Cancer Father          SOCIAL HISTORY  Social History     Occupational History   • RETIRED      Social History Main Topics   • Smoking status: Current Every Day Smoker     Packs/day: 2.00     Years: 50.00   • Smokeless tobacco: Never Used   • Alcohol use No      Comment: quit 3/1/18, DRANK 4.3 LITERS 3X WEEK   • Drug use: No   • Sexual activity: Defer         CURRENT MEDICATIONS    Current Outpatient Prescriptions:   •  amLODIPine (NORVASC) 5 MG tablet, Take 5 mg by mouth Daily., Disp: , Rfl:   •  aspirin 81 MG EC tablet, Take 81 mg by mouth Daily., Disp: , Rfl:   •  budesonide-formoterol (SYMBICORT) 160-4.5 MCG/ACT inhaler, Inhale 2 puffs 2 (Two) Times a Day As Needed., Disp: , Rfl:   •  esomeprazole (nexIUM) 20 MG capsule, Take 20 mg by mouth Every Morning Before Breakfast., Disp: , Rfl:   •  folic acid (FOLVITE) 800 MCG tablet, Take 1 tablet by mouth Daily., Disp: 30 tablet, Rfl: 0  •  gabapentin (NEURONTIN) 300 MG capsule, Take 300 mg by mouth 3 (Three) Times a Day., Disp: , Rfl:   •  nicotine (NICODERM CQ) 21 MG/24HR patch, Place 1 patch on the skin Daily. If patient desires to continue smoking cessation., Disp: 21 patch, Rfl: 0  •  oxyCODONE-acetaminophen (PERCOCET) 5-325 MG per tablet, Take 1 tablet by mouth Every 6 (Six) Hours As Needed for Severe Pain ., Disp: 20 tablet, Rfl: 0  •  polyethylene glycol (MIRALAX) pack packet, Take 17 g by mouth Daily. Over the counter, Disp: , Rfl:   •  saccharomyces boulardii (FLORASTOR) 250 MG  "capsule, Take 1 capsule by mouth 2 (Two) Times a Day., Disp: 60 capsule, Rfl: 0    ALLERGIES  Erythromycin and Percocet [oxycodone-acetaminophen]    VITALS  Vitals:    04/27/18 0913   BP: 118/72   Weight: 58.1 kg (128 lb)   Height: 167.6 cm (66\")       LAST RESULTS   Admission on 03/31/2018, Discharged on 04/06/2018   No results displayed because visit has over 200 results.        Nm Hepatobiliary Without Cck    Result Date: 4/2/2018  Narrative: Hepatobiliary scan  INDICATION: Right upper quadrant pain with fever and elevated white blood cell count. Abnormal CT 3/31/2018 showing cystic duct stone.  FINDINGS: Imaging was obtained of the abdomen for 2 hours after the IV administration of 5.7 mCi of technetium 99m-Choletec. COMPARISON made with CT abdomen 3/31/2018.  There is prompt uptake by the liver with prompt biliary and small bowel activity seen within 15 minutes. Gallbladder is not seen on the examination. This is concerning for cystic duct obstruction and acute cholecystitis.      Impression: Nonvisualization of the gallbladder at 2 hours suspicious for acute cholecystitis. No biliary obstruction.  A preliminary report was provided by Dr. Collier at 3:50 PM on 4/1/2018.  This report was finalized on 4/2/2018 9:58 AM by Dr. Jatin Pollock MD.      Us Abdomen Complete    Result Date: 4/2/2018  Narrative: INDICATION: Abdominal pain for 2 days.  EXAM: Abdominal ultrasound, complete.  COMPARISON: CT abdomen 3/31/2018  FINDINGS: Pancreas is normal. Liver parenchyma is homogeneous with no focal mass. There is a tiny cyst in the left hepatic lobe. Gallbladder is distended with a mildly thickened wall and a trace amount of pericholecystic fluid. Gallbladder sludge is noted. There may be some adenomyomatosis in the gallbladder wall. Common duct is prominent for size and 7 mm. No stone is seen within the duct. IVC is patent by Doppler. Abdominal aorta is normal in caliber. Spleen size is normal with a [id number]-ea-[id number] length " of 11.4 cm. Both kidneys are morphologically normal and nonobstructed.      Impression:  1. Distended gallbladder with mildly thickened wall and some pericholecystic fluid. There is also gallbladder sludge and probably adenomyomatosis of the gallbladder wall. Common duct is prominent as well at 7 mm. 2. The remainder of the abdominal ultrasound is negative.  A preliminary report was provided by Dr. Hinds at 1115 hours on 4/1/2018.  This report was finalized on 4/2/2018 10:07 AM by Dr. Jatin Pollock MD.      Ct Abdomen Pelvis With Contrast    Result Date: 4/1/2018  Narrative: CT ABDOMEN AND PELVIS WITH INTRAVENOUS CONTRAST  HISTORY: Diffuse abdominal pain worsening over the past day. Colonoscopy yesterday.  TECHNIQUE: Axial images were obtained with intravenous contrast. 100 cc of Isovue was used. COMPARISON from 2/16/2018. 1 previous CT and no nuclear cardiac studies over the past year. Radiation dose reduction techniques were utilized, including automated exposure control and exposure modulation based on body size.  ABDOMEN FINDINGS: The lung bases are clear. There is no evidence of free air in the peritoneal cavity. Fatty infiltration of the liver is noted. Shows improvement since the scan of 2/16/2018. A small hepatic cyst is unchanged. The spleen kidneys adrenals and pancreas have a normal appearance. The gallbladder is distended and there is a small pericholecystic fluid collection that was not seen on the previous exam. There is a small partially calcified gallstone in the gallbladder neck or cystic duct. The common bile duct is nondilated. FINDINGS worrisome for early cholecystitis. There is no evidence of retroperitoneal adenopathy or ascites and no distended bowel loops are seen.  PELVIS FINDINGS: In the pelvis there is no evidence of adenopathy mass or fluid collection. Normal appendix.      Impression: Small cystic duct stone with pericholecystic fluid suspicious for early cholecystitis. Improvement in  hepatic steatosis. Normal appendix. No evidence of free air in the peritoneal cavity.  This report was finalized on 4/1/2018 7:39 AM by Dr. Jatin Braga MD.      Xr Chest Pa & Lateral    Result Date: 4/2/2018  Narrative: Chest x-ray  INDICATION: Preoperative exam for cholecystectomy. COPD. Reflux disease.  FINDINGS: PA and lateral views of the chest were obtained. No comparison. Lungs are clear. Cardiac and mediastinal contours are normal. No pneumothorax.      Impression: No active disease.  A preliminary report was provided by Dr. Collier at 1540 hours on 4/1/2018.  This report was finalized on 4/2/2018 8:35 AM by Dr. Jatin Pollock MD.        PHYSICAL EXAM  Physical Exam   Constitutional: He appears well-developed and well-nourished.   Cardiovascular: Normal rate, regular rhythm and normal heart sounds.    Pulmonary/Chest: Breath sounds normal.   Abdominal:   Soft, Nondistended, nontender positive bowel sounds in all 4 quadrants.  Right upper quadrant incision healing well.   Nursing note and vitals reviewed.      ASSESSMENT  Status post laparoscopic converted to open cholecystectomy for acute necrotizing cholecystitis   Wounds healing well  Operative and pathology results discussed with the patient  Including intraoperative finding of micronodular cirrhosis     Diet, activity, wound care, weightlifting restrictions discussed.    Rx for Percocet 5/325 mg 1 by mouth every 6 when necessary severe pain #20 no refills was given to the patient.  As cautioned regarding excessive acetaminophen use and told not to take more than 2 g in 24 hours he was also advised to alternate with over-the-counter NSAIDs.      Tobacco abuse-tobacco cessation, discussed patient unwilling to quit    PLAN  Follow-up 4 weeks.  Patient was advised to call the office sooner if he has worsening symptoms or go to nearest emergency room.

## 2018-05-08 ENCOUNTER — OFFICE VISIT (OUTPATIENT)
Dept: GASTROENTEROLOGY | Facility: CLINIC | Age: 63
End: 2018-05-08

## 2018-05-08 VITALS
HEIGHT: 66 IN | BODY MASS INDEX: 21.18 KG/M2 | DIASTOLIC BLOOD PRESSURE: 68 MMHG | WEIGHT: 131.8 LBS | SYSTOLIC BLOOD PRESSURE: 122 MMHG

## 2018-05-08 DIAGNOSIS — K21.9 GASTROESOPHAGEAL REFLUX DISEASE, ESOPHAGITIS PRESENCE NOT SPECIFIED: ICD-10-CM

## 2018-05-08 DIAGNOSIS — R79.89 ELEVATED LFTS: Primary | ICD-10-CM

## 2018-05-08 DIAGNOSIS — K74.69 OTHER CIRRHOSIS OF LIVER (HCC): ICD-10-CM

## 2018-05-08 DIAGNOSIS — F10.10 ALCOHOL ABUSE: ICD-10-CM

## 2018-05-08 PROCEDURE — 99213 OFFICE O/P EST LOW 20 MIN: CPT | Performed by: INTERNAL MEDICINE

## 2018-05-08 NOTE — PROGRESS NOTES
PATIENT INFORMATION  Babak Herrera       - 1955    CHIEF COMPLAINT  Chief Complaint   Patient presents with   • Cirrhosis     follow up   • Heartburn       HISTORY OF PRESENT ILLNESS  HPI   he is here in follow up for elevated liver enzymes. He came in after his cls on 3/30, with chest pain, nausea and vomiting. CT and HIDA consistent with acute cholecystitis. She had open santiago.  He is feeling better. He has been off etoh for at least 3 months. LFTs were improving. CLS with polyps TA. Intraoperatively his liver looked cirrhotic.  Labs from previous workup reviewed. Elevated ferritin, but hemochromatosis gene testing was negative.  Weight and appetitie is good.      REVIEW OF SYSTEMS  Review of Systems   All other systems reviewed and are negative.        ACTIVE PROBLEMS  Patient Active Problem List    Diagnosis   • Adenomatous polyp of sigmoid colon [D12.5]   • Foot pain, bilateral [M79.671, M79.672]   • Abnormality of gait [R26.9]   • Spinal stenosis, lumbar region, with neurogenic claudication [M48.062]   • Acute cholecystitis [K81.0]     Overview Note:     Added automatically from request for surgery [id number]     • Right upper quadrant abdominal pain [R10.11]     Overview Note:     Added automatically from request for surgery [id number]     • Abdominal pain [R10.9]   • Elevated LFTs [R79.89]   • Encounter for screening for malignant neoplasm of colon [Z12.11]     Overview Note:     Added automatically from request for surgery [id number]           PAST MEDICAL HISTORY  Past Medical History:   Diagnosis Date   • Colon polyp    • COPD (chronic obstructive pulmonary disease)    • GERD (gastroesophageal reflux disease)    • Hypercholesteremia    • Hypertension          SURGICAL HISTORY  Past Surgical History:   Procedure Laterality Date   • CHOLECYSTECTOMY N/A 2018    Procedure: CHOLECYSTECTOMY LAPAROSCOPIC converted to open cholecystectomy;  Surgeon: Marley Tamez MD;  Location: [facility];  Service:  General   • CHOLECYSTECTOMY N/A 4/2/2018    Procedure: CHOLECYSTECTOMY;  Surgeon: Marley Tamez MD;  Location: [facility] OR;  Service: General   • COLONOSCOPY     • COLONOSCOPY W/ POLYPECTOMY N/A 3/30/2018    Procedure: COLONOSCOPY,  polypectomy;  Surgeon: Nicolasa Stewart MD;  Location: [facility] OR;  Service: Gastroenterology         FAMILY HISTORY  Family History   Problem Relation Age of Onset   • Cancer Father          SOCIAL HISTORY  Social History     Occupational History   • RETIRED      Social History Main Topics   • Smoking status: Current Every Day Smoker     Packs/day: 2.00     Years: 50.00   • Smokeless tobacco: Never Used   • Alcohol use No      Comment: quit 3/1/18, DRANK 4.3 LITERS 3X WEEK   • Drug use: No   • Sexual activity: Defer         CURRENT MEDICATIONS    Current Outpatient Prescriptions:   •  amLODIPine (NORVASC) 5 MG tablet, Take 5 mg by mouth Daily., Disp: , Rfl:   •  aspirin 81 MG EC tablet, Take 81 mg by mouth Daily., Disp: , Rfl:   •  budesonide-formoterol (SYMBICORT) 160-4.5 MCG/ACT inhaler, Inhale 2 puffs 2 (Two) Times a Day As Needed., Disp: , Rfl:   •  esomeprazole (nexIUM) 20 MG capsule, Take 20 mg by mouth Every Morning Before Breakfast., Disp: , Rfl:   •  folic acid (FOLVITE) 800 MCG tablet, Take 1 tablet by mouth Daily., Disp: 30 tablet, Rfl: 0  •  gabapentin (NEURONTIN) 300 MG capsule, Take 300 mg by mouth 3 (Three) Times a Day., Disp: , Rfl:   •  nicotine (NICODERM CQ) 21 MG/24HR patch, Place 1 patch on the skin Daily. If patient desires to continue smoking cessation., Disp: 21 patch, Rfl: 0  •  oxyCODONE-acetaminophen (PERCOCET) 5-325 MG per tablet, Take 1 tablet by mouth Every 6 (Six) Hours As Needed for Severe Pain ., Disp: 20 tablet, Rfl: 0  •  polyethylene glycol (MIRALAX) pack packet, Take 17 g by mouth Daily. Over the counter, Disp: , Rfl:   •  saccharomyces boulardii (FLORASTOR) 250 MG capsule, Take 1 capsule by mouth 2 (Two) Times a Day., Disp: 60 capsule, Rfl:  "0    ALLERGIES  Erythromycin and Percocet [oxycodone-acetaminophen]    VITALS  Vitals:    05/08/18 1403   BP: 122/68   Weight: 59.8 kg (131 lb 12.8 oz)   Height: 167.6 cm (65.98\")       LAST RESULTS   Admission on 03/31/2018, Discharged on 04/06/2018   No results displayed because visit has over 200 results.        No results found.    PHYSICAL EXAM  Physical Exam   Constitutional: He is oriented to person, place, and time. He appears well-developed and well-nourished. No distress.   HENT:   Head: Normocephalic and atraumatic.   Eyes: EOM are normal. Pupils are equal, round, and reactive to light.   Neck: Neck supple. No tracheal deviation present.   Cardiovascular: Normal rate, regular rhythm, normal heart sounds and intact distal pulses.  Exam reveals no gallop and no friction rub.    No murmur heard.  Pulmonary/Chest: Effort normal and breath sounds normal. No respiratory distress. He has no wheezes. He has no rales. He exhibits no tenderness.   Abdominal: Soft. Bowel sounds are normal. He exhibits no distension. There is no tenderness. There is no rebound and no guarding.   Musculoskeletal: He exhibits no edema.   Lymphadenopathy:     He has no cervical adenopathy.   Neurological: He is alert and oriented to person, place, and time.   Skin: Skin is warm. He is not diaphoretic. No erythema.   Psychiatric: He has a normal mood and affect. His behavior is normal. Judgment and thought content normal.   Nursing note and vitals reviewed.      ASSESSMENT  Diagnoses and all orders for this visit:    Elevated LFTs  -     Hepatic Function Panel  -     Ferritin    Gastroesophageal reflux disease, esophagitis presence not specified  -     Hepatic Function Panel  -     Ferritin    Other cirrhosis of liver  -     Hepatic Function Panel  -     Ferritin    Alcohol abuse  -     Hepatic Function Panel  -     Ferritin          PLAN  No Follow-up on file.    Cirrhosis discussed. Risk of hcc. Needs US yearly  Off etoh now, continue " with this.  Needs hep b and a vaccinations.

## 2018-05-30 ENCOUNTER — OFFICE VISIT (OUTPATIENT)
Dept: SURGERY | Facility: CLINIC | Age: 63
End: 2018-05-30

## 2018-05-30 VITALS
BODY MASS INDEX: 21.21 KG/M2 | DIASTOLIC BLOOD PRESSURE: 82 MMHG | WEIGHT: 132 LBS | SYSTOLIC BLOOD PRESSURE: 124 MMHG | HEIGHT: 66 IN

## 2018-05-30 DIAGNOSIS — Z09 SURGERY FOLLOW-UP: Primary | ICD-10-CM

## 2018-05-30 PROCEDURE — 99024 POSTOP FOLLOW-UP VISIT: CPT | Performed by: SURGERY

## 2018-05-30 NOTE — PROGRESS NOTES
"    PATIENT INFORMATION  Babak Herrera   - 1955    CHIEF COMPLAINT  Chief Complaint   Patient presents with   • Post-op Follow-up   8 wks 2 days s/p open santiago, no complaints, would like RF on pain med    HISTORY OF PRESENT ILLNESS  HPI  Patient presents to the office today for routine postoperative visit.  He is status post laparoscopic converted to open cholecystectomy for acute necrotizing cholecystitis.  Since last office visit with me he reports he is doing very well.  He reports \"I have never felt better\".  He has resumed work.  Denies nausea, vomiting, abdominal pain, jaundice like symptoms.  He reports his appetite is good, weight is stable and his bowels are moving regularly and denies any melena or hematochezia.  He was seen by Dr. Stewart yesterday.  I have reviewed her records.  He also had labs including LFTs done through his PCPs office and all LFTs have normalized except for alkaline phosphatase that is still elevated though it is trending down.  It is 216 as opposed to 273 in April.  His ferritin level is still up but hemachromatosis workup was negative.  He is scheduled to follow-up with Dr. Stewart in August.  He still continues to drink-reports he has cut down to weekly instead of daily.    REVIEW OF SYSTEMS  Review of Systems  As per history of present illness    ACTIVE PROBLEMS  Patient Active Problem List    Diagnosis   • Adenomatous polyp of sigmoid colon [D12.5]   • Foot pain, bilateral [M79.671, M79.672]   • Abnormality of gait [R26.9]   • Spinal stenosis, lumbar region, with neurogenic claudication [M48.062]   • Acute cholecystitis [K81.0]     Overview Note:     Added automatically from request for surgery [id number]     • Right upper quadrant abdominal pain [R10.11]     Overview Note:     Added automatically from request for surgery [id number]     • Abdominal pain [R10.9]   • Elevated LFTs [R79.89]   • Encounter for screening for malignant neoplasm of colon [Z12.11]     Overview Note:     " Added automatically from request for surgery [id number]           PAST MEDICAL HISTORY  Past Medical History:   Diagnosis Date   • Colon polyp    • COPD (chronic obstructive pulmonary disease)    • GERD (gastroesophageal reflux disease)    • Hypercholesteremia    • Hypertension          SURGICAL HISTORY  Past Surgical History:   Procedure Laterality Date   • CHOLECYSTECTOMY N/A 4/2/2018    Procedure: CHOLECYSTECTOMY LAPAROSCOPIC converted to open cholecystectomy;  Surgeon: Marley Tamez MD;  Location: [facility] OR;  Service: General   • CHOLECYSTECTOMY N/A 4/2/2018    Procedure: CHOLECYSTECTOMY;  Surgeon: Marley Tamez MD;  Location: [facility] OR;  Service: General   • COLONOSCOPY     • COLONOSCOPY W/ POLYPECTOMY N/A 3/30/2018    Procedure: COLONOSCOPY,  polypectomy;  Surgeon: Nicolasa Stewart MD;  Location: [facility] OR;  Service: Gastroenterology         FAMILY HISTORY  Family History   Problem Relation Age of Onset   • Cancer Father          SOCIAL HISTORY  Social History     Occupational History   • RETIRED      Social History Main Topics   • Smoking status: Current Every Day Smoker     Packs/day: 2.00     Years: 50.00   • Smokeless tobacco: Never Used   • Alcohol use No      Comment: quit 3/1/18, DRANK 4.3 LITERS 3X WEEK   • Drug use: No   • Sexual activity: Defer         CURRENT MEDICATIONS    Current Outpatient Prescriptions:   •  amLODIPine (NORVASC) 5 MG tablet, Take 5 mg by mouth Daily., Disp: , Rfl:   •  aspirin 81 MG EC tablet, Take 81 mg by mouth Daily., Disp: , Rfl:   •  budesonide-formoterol (SYMBICORT) 160-4.5 MCG/ACT inhaler, Inhale 2 puffs 2 (Two) Times a Day As Needed., Disp: , Rfl:   •  esomeprazole (nexIUM) 20 MG capsule, Take 20 mg by mouth Every Morning Before Breakfast., Disp: , Rfl:   •  folic acid (FOLVITE) 800 MCG tablet, Take 1 tablet by mouth Daily., Disp: 30 tablet, Rfl: 0  •  gabapentin (NEURONTIN) 300 MG capsule, Take 300 mg by mouth 3 (Three) Times a Day., Disp: , Rfl:   •  nicotine  "(NICODERM CQ) 21 MG/24HR patch, Place 1 patch on the skin Daily. If patient desires to continue smoking cessation., Disp: 21 patch, Rfl: 0  •  oxyCODONE-acetaminophen (PERCOCET) 5-325 MG per tablet, Take 1 tablet by mouth Every 6 (Six) Hours As Needed for Severe Pain ., Disp: 20 tablet, Rfl: 0  •  polyethylene glycol (MIRALAX) pack packet, Take 17 g by mouth Daily. Over the counter, Disp: , Rfl:   •  saccharomyces boulardii (FLORASTOR) 250 MG capsule, Take 1 capsule by mouth 2 (Two) Times a Day., Disp: 60 capsule, Rfl: 0    ALLERGIES  Erythromycin and Percocet [oxycodone-acetaminophen]    VITALS  Vitals:    05/30/18 0949   BP: 124/82   Weight: 59.9 kg (132 lb)   Height: 167.6 cm (65.98\")       LAST RESULTS   Admission on 03/31/2018, Discharged on 04/06/2018   No results displayed because visit has over 200 results.        No results found.    PHYSICAL EXAM  Physical Exam   Constitutional: He appears well-developed and well-nourished.   Eyes: No scleral icterus.   Cardiovascular: Normal rate, regular rhythm and normal heart sounds.    Pulmonary/Chest: Breath sounds normal.   Abdominal:   Soft, nondistended nontender positive bowel sounds in all 4 quadrants.  Well-healed right upper quadrant scar, no incisional tenderness.   Nursing note and vitals reviewed.      ASSESSMENT  Status post laparoscopic converted to open cholecystectomy for acute necrotizing cholecystitis   Intraoperative finding of cirrhosis.  Wounds well-healed  Overall he is doing very well  His LFTs have normalized except for alkaline phosphatase but it is trending down, clinically he is doing very well.    I have discussed with him that we should obtain another hepatic function panel in 1 month, if alkaline phosphatase continues to trend downwards and normalizes that is reassuring;  if upon the one month follow-up the alkaline phosphatase is still elevated then I would like to proceed with ultrasound liver/biliary tree and or MRCP to rule out " retained common bile duct stone.  His total bilirubin please note is completely normal.  Patient is agreeable.    He did ask me for a refill on the Percocet or Norco.  I advised him to refrain from excessive acetaminophen used due to his cirrhosis.  And I recommended that he can use over-the-counter anti-inflammatories/NSAIDs as needed for pain.  Patient verbalized understanding.    PLAN  Follow-up one month.  Patient was advised to call the office sooner should he worsening symptoms or go to the nearest emergency room.

## 2018-06-25 RX ORDER — GABAPENTIN 300 MG/1
CAPSULE ORAL
Qty: 90 CAPSULE | Refills: 0 | OUTPATIENT
Start: 2018-06-25

## 2018-07-31 ENCOUNTER — TELEPHONE (OUTPATIENT)
Dept: GASTROENTEROLOGY | Facility: CLINIC | Age: 63
End: 2018-07-31

## 2018-07-31 NOTE — TELEPHONE ENCOUNTER
CALLING WITH EXPLANATION ON CANCELLING APPT.  HE HAS A LOT OF DOCTOR BILLS AND NEEDS TO GET THEM DOWN BEFORE TURNING HIM OVER TO COLLECTIONS.  HE WILL RESCHEDULE AT LATER DATE.

## (undated) DEVICE — ENDOPATH 5MM ENDOSCOPIC BLUNT TIP DISSECTORS (12 POUCHES CONTAINING 3 DISSECTORS EACH): Brand: ENDOPATH

## (undated) DEVICE — ENDOPATH XCEL DILATING TIP TROCARS WITH STABILITY SLEEVES: Brand: ENDOPATH XCEL

## (undated) DEVICE — ENDOCUT SCISSOR TIP, DISPOSABLE: Brand: RENEW

## (undated) DEVICE — PAD GRND E/S W/CORD SPLT A/

## (undated) DEVICE — CONTAINER,SPECIMEN,OR STERILE,4OZ: Brand: MEDLINE

## (undated) DEVICE — SEALANT HEMOS FLOSEAL MATRX W/MALL TP 5ML

## (undated) DEVICE — TUBING, SUCTION, 1/4" X 12', STRAIGHT: Brand: MEDLINE

## (undated) DEVICE — SPNG GZ 2S 2X2 8PLY STRL PK/2

## (undated) DEVICE — PAD,ABDOMINAL,5"X9",STERILE,LF,1/PK: Brand: MEDLINE INDUSTRIES, INC.

## (undated) DEVICE — PK LAP GEN 90

## (undated) DEVICE — Device

## (undated) DEVICE — SPNG DISECTOR KTNER XRAY COTN 1/4X9/16IN PK/5

## (undated) DEVICE — SUCTION CANISTER, 3000CC,SAFELINER: Brand: DEROYAL

## (undated) DEVICE — TRAP POLYP 4CHAMBER

## (undated) DEVICE — SUT VIC 0/0 UR6 27IN DYED J603H

## (undated) DEVICE — ULTRACLEAN ACCESSORY ELECTRODE 1" (2.54 CM) COATED BLADE: Brand: ULTRACLEAN

## (undated) DEVICE — GOWN ISOL W/THUMB UNIV BLU BX/15

## (undated) DEVICE — SUCTION CANISTER, 1000CC,SAFELINER: Brand: DEROYAL

## (undated) DEVICE — SUT ETHIB 0/0 MO6 I8IN CX45D

## (undated) DEVICE — JACKT LAB KNIT COLR LG BLU

## (undated) DEVICE — Device: Brand: DEFENDO AIR/WATER/SUCTION AND BIOPSY VALVE

## (undated) DEVICE — COL CULT SYS

## (undated) DEVICE — APPL CHLORAPREP W/TINT 26ML ORNG

## (undated) DEVICE — TROCARS: Brand: KII® BALLOON BLUNT TIP SYSTEM

## (undated) DEVICE — DRN JP RND NO TROC SIL 19F 1/4IN

## (undated) DEVICE — GLV SURG SENSICARE W/ALOE PF LF 6.5 STRL

## (undated) DEVICE — DRSNG SURESITE WNDW 4X4.5

## (undated) DEVICE — 1842 FOAM BLOCK NEEDLE COUNTER: Brand: DEVON

## (undated) DEVICE — RESERVOIR,SUCTION,100CC,SILICONE: Brand: MEDLINE

## (undated) DEVICE — ADHS LIQ MASTISOL 2/3ML

## (undated) DEVICE — SPONGE,DRAIN,NONWVN,4"X4",6PLY,STRL,LF: Brand: MEDLINE

## (undated) DEVICE — ADHESIVE ISLAND DRESSING: Brand: TELFA

## (undated) DEVICE — SPNG GZ WOVN 4X4IN 12PLY 10/BX STRL

## (undated) DEVICE — DECANTER: Brand: UNBRANDED

## (undated) DEVICE — MEDI-VAC YANKAUER SUCTION HANDLE: Brand: CARDINAL HEALTH

## (undated) DEVICE — SYR LL 3CC

## (undated) DEVICE — SYR CONTRL LUERLOK 10CC

## (undated) DEVICE — VIAL FORMALIN CAP 10P 40ML

## (undated) DEVICE — 2, DISPOSABLE SUCTION/IRRIGATOR WITH DISPOSABLE TIP: Brand: STRYKEFLOW

## (undated) DEVICE — MEDI-VAC YANK SUCT HNDL W/TPRD BULBOUS TIP: Brand: CARDINAL HEALTH

## (undated) DEVICE — SPNG LAP 18X18IN LF STRL PK/5

## (undated) DEVICE — 3M™ STERI-STRIP™ REINFORCED ADHESIVE SKIN CLOSURES, R1547, 1/2 IN X 4 IN (12 MM X 100 MM), 6 STRIPS/ENVELOPE: Brand: 3M™ STERI-STRIP™

## (undated) DEVICE — MASK,FACE,FLUID RESIST,SHLD,EARLOOP: Brand: MEDLINE

## (undated) DEVICE — ENDOPATH XCEL BLADELESS TROCARS WITH STABILITY SLEEVES: Brand: ENDOPATH XCEL

## (undated) DEVICE — DRSNG SURESITE WNDW 2.38X2.75

## (undated) DEVICE — SYR LUER SLPTP 50ML

## (undated) DEVICE — SNAR POLYP CAPTIFLX WD OVL 27MM 240CM

## (undated) DEVICE — ELECTRD BLD EXT EDGE 1P COAT 6.5IN STRL

## (undated) DEVICE — CULT ANAERB

## (undated) DEVICE — SUT MNCRYL 4/0 PS2 18 IN

## (undated) DEVICE — TROCAR SITE CLOSURE DEVICE: Brand: ENDO CLOSE

## (undated) DEVICE — ENDOPATH XCEL UNIVERSAL TROCAR STABLILITY SLEEVES: Brand: ENDOPATH XCEL

## (undated) DEVICE — ENDOPOUCH RETRIEVER SPECIMEN RETRIEVAL BAGS: Brand: ENDOPOUCH RETRIEVER

## (undated) DEVICE — BW-412T DISP COMBO CLEANING BRUSH: Brand: SINGLE USE COMBINATION CLEANING BRUSH

## (undated) DEVICE — TBG INSUFL W FLTR STRL

## (undated) DEVICE — SUT NLY 2/0 664G